# Patient Record
Sex: FEMALE | Race: BLACK OR AFRICAN AMERICAN | NOT HISPANIC OR LATINO | Employment: PART TIME | URBAN - METROPOLITAN AREA
[De-identification: names, ages, dates, MRNs, and addresses within clinical notes are randomized per-mention and may not be internally consistent; named-entity substitution may affect disease eponyms.]

---

## 2017-01-03 DIAGNOSIS — N83.201 BILATERAL OVARIAN CYSTS: Primary | ICD-10-CM

## 2017-01-03 DIAGNOSIS — N83.202 BILATERAL OVARIAN CYSTS: Primary | ICD-10-CM

## 2017-02-01 ENCOUNTER — HOSPITAL ENCOUNTER (EMERGENCY)
Facility: HOSPITAL | Age: 52
Discharge: HOME | End: 2017-02-01
Attending: EMERGENCY MEDICINE
Payer: MEDICAID

## 2017-02-01 VITALS
HEIGHT: 62 IN | DIASTOLIC BLOOD PRESSURE: 77 MMHG | TEMPERATURE: 99 F | SYSTOLIC BLOOD PRESSURE: 140 MMHG | OXYGEN SATURATION: 98 % | RESPIRATION RATE: 18 BRPM | BODY MASS INDEX: 32.94 KG/M2 | HEART RATE: 75 BPM | WEIGHT: 179 LBS

## 2017-02-01 DIAGNOSIS — L72.0 EPIDERMOID CYST: Primary | ICD-10-CM

## 2017-02-01 PROCEDURE — 25000003 PHARM REV CODE 250: Performed by: PHYSICIAN ASSISTANT

## 2017-02-01 PROCEDURE — 99283 EMERGENCY DEPT VISIT LOW MDM: CPT | Mod: 25

## 2017-02-01 PROCEDURE — 10060 I&D ABSCESS SIMPLE/SINGLE: CPT

## 2017-02-01 RX ORDER — KETOROLAC TROMETHAMINE 30 MG/ML
10 INJECTION, SOLUTION INTRAMUSCULAR; INTRAVENOUS
Status: DISCONTINUED | OUTPATIENT
Start: 2017-02-01 | End: 2017-02-01 | Stop reason: HOSPADM

## 2017-02-01 RX ORDER — DICYCLOMINE HYDROCHLORIDE 20 MG/1
20 TABLET ORAL 2 TIMES DAILY
Qty: 20 TABLET | Refills: 0 | Status: SHIPPED | OUTPATIENT
Start: 2017-02-01 | End: 2017-03-03

## 2017-02-01 RX ORDER — LIDOCAINE HYDROCHLORIDE 10 MG/ML
10 INJECTION INFILTRATION; PERINEURAL
Status: DISCONTINUED | OUTPATIENT
Start: 2017-02-01 | End: 2017-02-01

## 2017-02-01 RX ORDER — HYDROCODONE BITARTRATE AND ACETAMINOPHEN 5; 325 MG/1; MG/1
1 TABLET ORAL
Status: DISCONTINUED | OUTPATIENT
Start: 2017-02-01 | End: 2017-02-01 | Stop reason: HOSPADM

## 2017-02-01 RX ORDER — LIDOCAINE HYDROCHLORIDE AND EPINEPHRINE 20; 10 MG/ML; UG/ML
10 INJECTION, SOLUTION INFILTRATION; PERINEURAL
Status: COMPLETED | OUTPATIENT
Start: 2017-02-01 | End: 2017-02-01

## 2017-02-01 RX ADMIN — LIDOCAINE HYDROCHLORIDE,EPINEPHRINE BITARTRATE 10 ML: 20; .01 INJECTION, SOLUTION INFILTRATION; PERINEURAL at 05:02

## 2017-02-01 NOTE — ED AVS SNAPSHOT
[facility]  [place] [address]               Carlos Palencia   2017  4:52 PM   ED    Description:  Female : 1965   Department:  [facility]           Your Care was Coordinated By:     Provider Role From To    Jg Isbell III, MD Attending Provider [phone] --    Chris Bergman PA-C Physician Assistant [phone] --      Reason for Visit     Abdominal Pain     Abscess           Diagnoses this Visit        Comments    Epidermoid cyst    -  Primary       ED Disposition     ED Disposition Condition Comment    Discharge             To Do List           Follow-up Information     Follow up with [facility]. Go on 2017.    Why:  Go to scheduled appt on Monday    Contact information:    [address] [address]  [phone]          Follow up with [facility].    Specialty:  Emergency Medicine    Why:  As needed, if begin with signs of infection, if unable to tolerate pain    Contact information:    [place] [id number] [phone]       These Medications        Disp Refills Start End    dicyclomine (BENTYL) 20 mg tablet 20 tablet 0 2017 3/3/2017    Take 1 tablet (20 mg total) by mouth 2 (two) times daily. - Oral    Pharmacy: [facility] [id number] [place] [address] [address] [facility] Ph #: [phone]         [facility]     [facility] -  Assistance  Registered nurses in the [facility] provide clinical advisement, health education, appointment booking, and other advisory services.  Call for this free service at [phone].             Medications           Message regarding Medications     Verify the changes and/or additions to your medication regime listed below are the same as discussed with your clinician today.  If  any of these changes or additions are incorrect, please notify your healthcare provider.        START taking these NEW medications        Refills    dicyclomine (BENTYL) 20 mg tablet 0    Sig: Take 1 tablet (20 mg total) by mouth 2 (two) times daily.    Class: Print    Route: Oral      These medications were administered today        Dose Freq    lidocaine-EPINEPHrine 2%-1:100,000 injection 10 mL 10 mL ED 1 Time    Sig: Inject 10 mLs into the skin ED 1 Time.    Class: Normal    Route: Intradermal    Cosign for Ordering: Required by Jg Isbell III, MD    hydrocodone-acetaminophen 5-325mg per tablet 1 tablet 1 tablet ED 1 Time    Sig: Take 1 tablet by mouth ED 1 Time.    Class: Normal    Route: Oral    Cosign for Ordering: Required by Jg Isbell III, MD    ketorolac injection 10 mg 10 mg ED 1 Time    Sig: Inject 10 mg into the muscle ED 1 Time.    Class: Normal    Route: Intramuscular    Cosign for Ordering: Required by Jg Isbell III, MD      STOP taking these medications     diphenhydramine-acetaminophen (TYLENOL PM)  mg Tab Take 1 tablet by mouth once daily.    oxycodone-acetaminophen (PERCOCET)  mg per tablet Take 1 tablet by mouth every 4 (four) hours as needed for Pain.           Verify that the below list of medications is an accurate representation of the medications you are currently taking.  If none reported, the list may be blank. If incorrect, please contact your healthcare provider. Carry this list with you in case of emergency.           Current Medications     amlodipine (NORVASC) 10 MG tablet Take 10 mg by mouth once daily.    dicyclomine (BENTYL) 20 mg tablet Take 1 tablet (20 mg total) by mouth 2 (two) times daily.    hydrocodone-acetaminophen 5-325mg per tablet 1 tablet Take 1 tablet by mouth ED 1 Time.    ketorolac injection 10 mg Inject 10 mg into the muscle ED 1 Time.    pravastatin (PRAVACHOL) 10 MG tablet Take 10 mg by mouth once daily.           Clinical Reference  "Information           Your Vitals Were     BP Pulse Temp Resp Height Weight    140/77 (BP Location: Left arm, Patient Position: Sitting) 75 98.5 °F (36.9 °C) (Oral) 18 5' 2" (1.575 m) 81.2 kg (179 lb)    Last Period SpO2 BMI          01/01/2017 (Approximate) 98% 32.74 kg/m2        Allergies as of 2/1/2017     No Known Allergies      Immunizations Administered on Date of Encounter - 2/1/2017     None      ED Micro, Lab, POCT     None      ED Imaging Orders     None        Discharge Instructions         Understanding Epidermoid Cyst    An epidermoid cyst is a small abnormal growth in the top layers of the skin. It is filled with keratin, the same proteins that make up your hair and nails. These cysts grow slowly. They can grow anywhere on the body. But they are most often found on the face, behind the ears, and on the chest or upper back.  How to say it  [id number] sist   What causes an epidermoid cyst?  An epidermoid cyst may occur because of an injury to the skin or from acne.  Symptoms of an epidermoid cyst  An epidermoid cyst is a subcutaneous bump. This means it is just below the skin. It may be yellow or skin-colored. It often has a small black anibal in the middle of it, like a blackhead.  An epidermoid cyst rarely causes pain, unless it ruptures or becomes infected. It may ooze keratin, a white, cheesy, smelly material. If the cyst becomes inflamed or infected, it may be:  · Bad smelling  · Red  · Swollen  · Tender  Treatment for an epidermoid cyst  Many epidermoid cysts dont cause any problems. They dont necessarily need to be treated. They may go away on their own. But you may want to treat it if you dont like how it looks or it becomes inflamed.  Treatment options include:  · Steroid injection. A steroid may be injected into the cyst. This may help ease inflammation. It may also prevent infection.  · Surgical removal. The cyst can be cut out. It may also need to be drained first. There is a slight " chance the cyst may come back.  · Antibiotics. In some cases, you may need to take an oral antibiotic to prevent infection and regrowth.  When to call your healthcare provider  Call your healthcare provider right away if you have any of these:  · Fever of 100.4°F (38°C) or higher, or as directed  · Redness, swelling, or fluid leaking from your incision that gets worse  · Pain that gets worse  · Symptoms that dont get better, or get worse  · New symptoms   © 6735-4244 [employer]. [address] [place] [address]. All rights reserved. This information is not intended as a substitute for professional medical care. Always follow your healthcare professional's instructions.          [facility] Sign-Up     Activating your [facility] account is as easy as 1-2-3!     1) Visit [email or web address], select Sign Up Now, enter this activation code and your date of birth, then select Next.  [email or web address]  Expires: 3/18/2017  5:56 PM      2) Create a username and password to use when you visit [facility] in the future and select a security question in case you lose your password and select Next.    3) Enter your e-mail address and click Sign Up!    Additional Information  If you have questions, please e-mail [email or web address] or call [phone] to talk to our [facility] staff. Remember, [facility] is NOT to be used for urgent needs. For medical emergencies, dial 911.          [facility] complies with applicable Federal civil rights laws and does not discriminate on the basis of race, color, national origin, age, disability, or sex.        Language Assistance Services     ATTENTION: Language assistance services are available, free of charge. Please call [phone].      ATENCIÓN: Si habla micky, tiene a gonzalez disposición servicios gratuitos de asistencia lingüística. Llame al [phone].     CHÚ Ý: N?u b?n nói Ti?ng Vi?t, có các d?ch v? h? tr? ngôn ng? mi?n phí dành cho b?n. G?i s?  [phone].

## 2017-02-01 NOTE — ED PROVIDER NOTES
"Encounter Date: 2/1/2017       History     Chief Complaint   Patient presents with    Abdominal Pain     Pt states she has a possible abscess in her back that has gotten very sore and tender.  Also c/o abdominal pain    Abscess     Review of patient's allergies indicates:  No Known Allergies  HPI Comments: 52yo f with pmh HTN, HLD, Arthritis, left ovarian cyst, presents with chief complaint abscess to back x 1 week. Pt states she has had a "bump" on her back for a few years, but over the past week has become significantly swollen and painful. Denies fever/chills/recent illness. Pt also admits to intermittent abdominal pain recently, but has no complaints of abdominal pain at this time. She had established care at [facility], but recently received medicaid coverage and needed to establish another primary care physician. She has an appt with [facility] on Monday.     The history is provided by the patient.     Past Medical History   Diagnosis Date    Arthritis     History of heavy vaginal bleeding     Hyperlipidemia     Hypertension      No past medical history pertinent negatives.  Past Surgical History   Procedure Laterality Date    Dilation and curettage of uterus       Family History   Problem Relation Age of Onset    Hypertension Mother     Diabetes Mother     Cancer Father     Cancer Brother      Social History   Substance Use Topics    Smoking status: Never Smoker    Smokeless tobacco: None    Alcohol use Yes      Comment: socially     Review of Systems   Constitutional: Negative for activity change, appetite change, chills and fever.   HENT: Negative.    Eyes: Negative.    Respiratory: Negative for cough, shortness of breath and wheezing.    Cardiovascular: Negative for chest pain and leg swelling.   Gastrointestinal: Positive for abdominal pain. Negative for abdominal distention, constipation, diarrhea, nausea and vomiting.   Endocrine: Negative.    Genitourinary: Negative for difficulty urinating, " dysuria, flank pain, hematuria and menstrual problem.   Musculoskeletal: Negative for neck pain and neck stiffness.   Skin: Positive for wound. Negative for color change and rash.   Allergic/Immunologic: Negative.    Neurological: Negative.    Hematological: Negative.    Psychiatric/Behavioral: Negative.    All other systems reviewed and are negative.      Physical Exam   Initial Vitals   BP Pulse Resp Temp SpO2   02/01/17 1620 02/01/17 1620 02/01/17 1620 02/01/17 1620 02/01/17 1620   140/77 75 18 98.5 °F (36.9 °C) 98 %     Physical Exam    Nursing note and vitals reviewed.  Constitutional: She appears well-developed and well-nourished. She does not have a sickly appearance. She does not appear ill. No distress.   HENT:   Head: Normocephalic and atraumatic.   Right Ear: External ear normal.   Left Ear: External ear normal.   Nose: Nose normal.   Mouth/Throat: Oropharynx is clear and moist.   Eyes: Conjunctivae and EOM are normal.   Neck: Normal range of motion. Neck supple.   Cardiovascular: Normal rate, regular rhythm, normal heart sounds and intact distal pulses.   Pulmonary/Chest: Breath sounds normal. No respiratory distress. She has no wheezes. She has no rhonchi. She exhibits no tenderness.   Abdominal: Soft. Bowel sounds are normal. She exhibits no distension and no mass. There is no tenderness. There is no guarding.   Musculoskeletal: Normal range of motion. She exhibits no tenderness.   Lymphadenopathy:     She has no cervical adenopathy.   Neurological: She is alert and oriented to person, place, and time. She has normal strength.   Skin: Skin is warm and dry. No rash and no abscess noted. No erythema.   Epidermoid cyst with follicular opening in the mid back just left of spine, approx 1.5x1.5cm. No cellulitis or erythema; scant fluctuance palpated but able to express some black material through massage   Psychiatric: She has a normal mood and affect. Her behavior is normal. Judgment and thought content  normal.         ED Course   I & D - Incision and Drainage  Date/Time: 2017 5:42 PM  Performed by: HERMELINDO GUNDERSON  Authorized by: MAKI SHEFFIELD III   Type: cyst (epidermoid cyst)  Body area: trunk  Location details: back  Anesthesia: local infiltration    Anesthesia:  Anesthesia: local infiltration  Local Anesthetic: lidocaine 2% with epinephrine   Anesthetic total: 2 mL  Patient sedated: no  Scalpel size: 11  Incision type: single straight  Complexity: simple  Drainage: bloody (necrotic tissue)  Drainage amount: scant  Wound treatment: incision and  wound left open  Complications: No  Patient tolerance: Patient tolerated the procedure well with no immediate complications        Labs Reviewed - No data to display          Medical Decision Making:   Initial Assessment:   50yo f with chief complaint epidermoid cyst to back  Differential Diagnosis:   Cyst, abscess, furuncle, carbuncle  ED Management:  Well-appearing, in NAD. Pt has no abdominal pain at this time. Denies n/v/d. Afebrile. Decision made to express material through I&D due to acute swelling and pain. Cyst opened with small incision, some necrotic tissue expressed. Pt to establish primary care on Monday, so will have wound reevaluated at that time. Pt to return to ED if signs of infection, if unable to tolerate pain.   Other:   I have discussed this case with another health care provider.       <> Summary of the Discussion: I did discuss this case with .              Attending Attestation:     Physician Attestation Statement for NP/PA:   I discussed this assessment and plan of this patient with the NP/PA, but I did not personally examine the patient. The face to face encounter was performed by the NP/PA.    Other NP/PA Attestation Additions:      Medical Decision Makin-year-old female presents complaining of epidermoid cyst to her back.  Physical examination does reveal a tender cyst, although without convincing evidence of infection.   Given the acute increase in size and development of pain, we have performed an incision and drainage with expression of caseous material and also necrotic tissue.  Will discharge with recommendations for supportive care.                  ED Course     Clinical Impression:   The encounter diagnosis was Epidermoid cyst.          Chris Bergman PA-C  02/02/17 0106       Jg Isbell III, MD  02/21/17 4628

## 2017-02-23 ENCOUNTER — HOSPITAL ENCOUNTER (OUTPATIENT)
Dept: RADIOLOGY | Facility: HOSPITAL | Age: 52
Discharge: HOME | End: 2017-02-23
Attending: NURSE PRACTITIONER
Payer: MEDICAID

## 2017-02-23 DIAGNOSIS — N92.6 IRREGULAR MENSTRUAL CYCLE: ICD-10-CM

## 2017-02-23 DIAGNOSIS — M19.90 OSTEOARTHRITIS: ICD-10-CM

## 2017-02-23 PROCEDURE — 73560 X-RAY EXAM OF KNEE 1 OR 2: CPT | Mod: TC,LT

## 2017-02-23 PROCEDURE — 73560 X-RAY EXAM OF KNEE 1 OR 2: CPT | Mod: 26,LT,, | Performed by: RADIOLOGY

## 2017-02-23 PROCEDURE — 76856 US EXAM PELVIC COMPLETE: CPT | Mod: 26,,, | Performed by: RADIOLOGY

## 2017-02-23 PROCEDURE — 76830 TRANSVAGINAL US NON-OB: CPT | Mod: 26,,, | Performed by: RADIOLOGY

## 2017-02-23 PROCEDURE — 76856 US EXAM PELVIC COMPLETE: CPT | Mod: TC

## 2017-04-26 ENCOUNTER — HOSPITAL ENCOUNTER (OUTPATIENT)
Dept: RADIOLOGY | Facility: HOSPITAL | Age: 52
Discharge: HOME | End: 2017-04-26
Attending: OBSTETRICS & GYNECOLOGY
Payer: MEDICAID

## 2017-04-26 DIAGNOSIS — Z12.31 VISIT FOR SCREENING MAMMOGRAM: ICD-10-CM

## 2017-04-26 PROCEDURE — 77063 BREAST TOMOSYNTHESIS BI: CPT | Mod: 26,,, | Performed by: RADIOLOGY

## 2017-04-26 PROCEDURE — 77067 SCR MAMMO BI INCL CAD: CPT | Mod: TC

## 2017-04-26 PROCEDURE — 77067 SCR MAMMO BI INCL CAD: CPT | Mod: 26,,, | Performed by: RADIOLOGY

## 2017-04-27 ENCOUNTER — TELEPHONE (OUTPATIENT)
Dept: OBSTETRICS AND GYNECOLOGY | Facility: HOSPITAL | Age: 52
End: 2017-04-27

## 2017-04-27 DIAGNOSIS — R92.8 ABNORMAL MAMMOGRAM OF BOTH BREASTS: Primary | ICD-10-CM

## 2017-05-01 ENCOUNTER — TELEPHONE (OUTPATIENT)
Dept: OBSTETRICS AND GYNECOLOGY | Facility: CLINIC | Age: 52
End: 2017-05-01

## 2017-05-01 NOTE — TELEPHONE ENCOUNTER
5/1/2017 Patient is aware of abnormal mammo and will have additional views done per KAYCEE Lanier. derek

## 2017-05-01 NOTE — TELEPHONE ENCOUNTER
----- Message from Doris Covarrubias sent at 5/1/2017  8:20 AM CDT -----  Contact: self  Patient requesting a call back from Trista per message 4/27. Patient states that she is concerned and has taking off from work today and would like a call back immediately. Please contact her at [phone].    Thanks!

## 2017-05-03 ENCOUNTER — TELEPHONE (OUTPATIENT)
Dept: RADIOLOGY | Facility: HOSPITAL | Age: 52
End: 2017-05-03

## 2017-05-03 NOTE — TELEPHONE ENCOUNTER
informed patient we are waiting on outside imaging from [facility] for comparison. Additoinal imaging may not be necessary. Appt cancelled and pt will be informed once we receive outside imaging

## 2017-05-03 NOTE — TELEPHONE ENCOUNTER
Waiting on prior mammograms from [facility]. Faxed release on 4-26-17.        By Belkis Boucher, RT

## 2017-05-11 ENCOUNTER — TELEPHONE (OUTPATIENT)
Dept: RADIOLOGY | Facility: HOSPITAL | Age: 52
End: 2017-05-11

## 2017-05-11 ENCOUNTER — TELEPHONE (OUTPATIENT)
Dept: OBSTETRICS AND GYNECOLOGY | Facility: HOSPITAL | Age: 52
End: 2017-05-11

## 2017-05-16 ENCOUNTER — HOSPITAL ENCOUNTER (OUTPATIENT)
Dept: RADIOLOGY | Facility: HOSPITAL | Age: 52
Discharge: HOME | End: 2017-05-16
Attending: OBSTETRICS & GYNECOLOGY
Payer: MEDICAID

## 2017-05-16 DIAGNOSIS — R92.8 ABNORMAL MAMMOGRAM OF BOTH BREASTS: ICD-10-CM

## 2017-05-16 PROCEDURE — 76642 ULTRASOUND BREAST LIMITED: CPT | Mod: 26,RT,, | Performed by: RADIOLOGY

## 2017-05-16 PROCEDURE — 76642 ULTRASOUND BREAST LIMITED: CPT | Mod: TC,RT

## 2017-05-16 PROCEDURE — 77061 BREAST TOMOSYNTHESIS UNI: CPT | Mod: 26,,, | Performed by: RADIOLOGY

## 2017-05-16 PROCEDURE — 77065 DX MAMMO INCL CAD UNI: CPT | Mod: 26,,, | Performed by: RADIOLOGY

## 2017-05-16 PROCEDURE — 77061 BREAST TOMOSYNTHESIS UNI: CPT | Mod: TC

## 2017-05-17 ENCOUNTER — TELEPHONE (OUTPATIENT)
Dept: OBSTETRICS AND GYNECOLOGY | Facility: CLINIC | Age: 52
End: 2017-05-17

## 2017-05-17 NOTE — TELEPHONE ENCOUNTER
----- Message from Edwin Lanier MD sent at 5/17/2017  8:44 AM CDT -----  Call pt and tell her MMG and ultrasound were both normal.

## 2017-11-30 ENCOUNTER — HOSPITAL ENCOUNTER (EMERGENCY)
Facility: HOSPITAL | Age: 52
Discharge: HOME | End: 2017-11-30
Attending: EMERGENCY MEDICINE
Payer: MEDICAID

## 2017-11-30 VITALS
WEIGHT: 180 LBS | OXYGEN SATURATION: 98 % | HEIGHT: 62 IN | BODY MASS INDEX: 33.13 KG/M2 | TEMPERATURE: 98 F | HEART RATE: 55 BPM | DIASTOLIC BLOOD PRESSURE: 70 MMHG | SYSTOLIC BLOOD PRESSURE: 126 MMHG | RESPIRATION RATE: 18 BRPM

## 2017-11-30 DIAGNOSIS — R07.89 CHEST PAIN, NON-CARDIAC: Primary | ICD-10-CM

## 2017-11-30 PROCEDURE — 93005 ELECTROCARDIOGRAM TRACING: CPT

## 2017-11-30 PROCEDURE — 99284 EMERGENCY DEPT VISIT MOD MDM: CPT

## 2017-11-30 PROCEDURE — 93010 ELECTROCARDIOGRAM REPORT: CPT | Mod: ,,, | Performed by: INTERNAL MEDICINE

## 2017-11-30 RX ORDER — IBUPROFEN 600 MG/1
600 TABLET ORAL EVERY 6 HOURS PRN
Qty: 20 TABLET | Refills: 0 | OUTPATIENT
Start: 2017-11-30 | End: 2019-02-11

## 2017-11-30 RX ORDER — METHOCARBAMOL 500 MG/1
1000 TABLET, FILM COATED ORAL 3 TIMES DAILY
Qty: 30 TABLET | Refills: 0 | Status: SHIPPED | OUTPATIENT
Start: 2017-11-30 | End: 2017-12-05

## 2017-11-30 NOTE — DISCHARGE INSTRUCTIONS
Please return immediately if you get worse or if new problems develop.  Please make an appointment to see your primary care doctor this week.  Rest.  Use a heating pad.  Rest.

## 2017-11-30 NOTE — ED TRIAGE NOTES
53 yo female comes in with the cc of left side pain. Pt states this has been going on since Tuesday after a work out. Pt can sleep in only one position, due to pain if she moves.

## 2017-11-30 NOTE — ED PROVIDER NOTES
Encounter Date: 11/30/2017    SCRIBE #1 NOTE: I, Easton Pickering, am scribing for, and in the presence of,  Jg Saleh MD. I have scribed the following portions of the note - Other sections scribed: HPI and ROS.       History     Chief Complaint   Patient presents with    Chest Pain     reproducable chest pains with movement x 2 days.  center chest radiating to neck, arm, leg, and back.  denies n/v/d or fever.     CC: Chest Pain     HPI:This 52 y.o F with arthritis, HLD and HTN presents to the ED c/o acute onset of L side chest pain radiating to L shoulder which began Tuesday PM after exercising. Pt's pain is worse when attempting to lay on her side and with deep inhalation. Additionally, the pt reports chronic L lateral knee pain. The pt denies fever, chills, headache, otalgia, dysuria, SOB and hx of DVT and CAD. No prior tx.       The history is provided by the patient. No  was used.     Review of patient's allergies indicates:  No Known Allergies  Past Medical History:   Diagnosis Date    Arthritis     History of heavy vaginal bleeding     Hyperlipidemia     Hypertension      Past Surgical History:   Procedure Laterality Date    DILATION AND CURETTAGE OF UTERUS       Family History   Problem Relation Age of Onset    Hypertension Mother     Diabetes Mother     Cancer Father     Cancer Brother      Social History   Substance Use Topics    Smoking status: Never Smoker    Smokeless tobacco: Not on file    Alcohol use Yes      Comment: socially     Review of Systems   Constitutional: Negative for chills and fever.   HENT: Negative for congestion, ear pain, rhinorrhea and sore throat.    Eyes: Negative for pain and visual disturbance.   Respiratory: Negative for cough and shortness of breath.    Cardiovascular: Positive for chest pain (L side).   Gastrointestinal: Negative for abdominal pain, diarrhea, nausea and vomiting.   Genitourinary: Negative for dysuria.   Musculoskeletal:  Negative for back pain and neck pain.        (+) L shoulder pain   Skin: Negative for rash.   Neurological: Negative for headaches.       Physical Exam     Initial Vitals [11/30/17 0928]   BP Pulse Resp Temp SpO2   (!) 140/84 60 15 98.5 °F (36.9 °C) 100 %      MAP       102.67         Physical Exam  The patient was examined specifically for the following:   General:No significant distress, Good color, Warm and dry. Head and neck:Scalp atraumatic, Neck supple. Neurological:Appropriate conversation, Gross motor deficits. Eyes:Conjugate gaze, Clear corneas. ENT: No epistaxis. Cardiac: Regular rate and rhythm, Grossly normal heart tones. Pulmonary: Wheezing, Rales. Gastrointestinal: Abdominal tenderness, Abdominal distention. Musculoskeletal: Extremity deformity, Apparent pain with range of motion of the joints. Skin: Rash.   The findings on examination were normal except for the following: The lungs are clear and free wheezing rales of the rhonchi.  Heart tones are normal.  The patient is a regular rate and rhythm.  The heart rate is 70.  The patient has pain with deep breathing during the physical exam.  There is no evidence of respiratory distress or shortness of breath.  The patient's respiratory rate is 16.  ED Course   Procedures  Labs Reviewed - No data to display  EKG Readings: (Independently Interpreted)   The patient's EKG reveals a sinus bradycardia with a heart rate of 59.  The WA QRS and QT intervals are normal.  There is no evidence of acute myocardial infarction or malignant arrhythmia.       X-Rays:   Independently Interpreted Readings:   Other Readings:  Chest x-ray fails to reveal pneumothorax pneumonia pleural effusion.    Medical decision making: Given the above this patient presents to the emergency with left-sided chest pain is worse when she lays on either side.  Chest x-ray fails to reveal pneumothorax pneumonia pleural effusion.  The patient is not short of breath or tachycardic.  I doubt  pulmonary embolus.  This pain is sharp positional and unilateral.  It does not seem consistent with coronary artery disease.  EKG is essentially unremarkable.  I will treat this patient with ibuprofen and referred her to follow-up with primary care.             Scribe Attestation:   Scribe #1: I performed the above scribed service and the documentation accurately describes the services I performed. I attest to the accuracy of the note.    Attending Attestation:           Physician Attestation for Scribe:  Physician Attestation Statement for Scribe #1: I, Jg Saleh MD, reviewed documentation, as scribed by Easton Pickering in my presence, and it is both accurate and complete.                 ED Course      Clinical Impression:   The encounter diagnosis was Chest pain, non-cardiac.                           Jg Saleh MD  12/08/17 0704

## 2017-12-04 ENCOUNTER — HOSPITAL ENCOUNTER (EMERGENCY)
Facility: HOSPITAL | Age: 52
Discharge: HOME | End: 2017-12-04
Attending: EMERGENCY MEDICINE
Payer: MEDICAID

## 2017-12-04 VITALS
RESPIRATION RATE: 19 BRPM | DIASTOLIC BLOOD PRESSURE: 84 MMHG | BODY MASS INDEX: 31.65 KG/M2 | SYSTOLIC BLOOD PRESSURE: 137 MMHG | HEART RATE: 62 BPM | TEMPERATURE: 98 F | HEIGHT: 62 IN | OXYGEN SATURATION: 99 % | WEIGHT: 172 LBS

## 2017-12-04 DIAGNOSIS — R51.9 FRONTAL HEADACHE: Primary | ICD-10-CM

## 2017-12-04 LAB
B-HCG UR QL: NEGATIVE
CTP QC/QA: YES

## 2017-12-04 PROCEDURE — 25000003 PHARM REV CODE 250: Performed by: PHYSICIAN ASSISTANT

## 2017-12-04 PROCEDURE — 99283 EMERGENCY DEPT VISIT LOW MDM: CPT | Mod: 25

## 2017-12-04 PROCEDURE — 81025 URINE PREGNANCY TEST: CPT | Performed by: PHYSICIAN ASSISTANT

## 2017-12-04 RX ORDER — BUTALBITAL, ACETAMINOPHEN AND CAFFEINE 50; 325; 40 MG/1; MG/1; MG/1
1 TABLET ORAL EVERY 6 HOURS PRN
Qty: 12 TABLET | Refills: 0 | Status: SHIPPED | OUTPATIENT
Start: 2017-12-04 | End: 2018-01-03

## 2017-12-04 RX ORDER — BUTALBITAL, ACETAMINOPHEN AND CAFFEINE 50; 325; 40 MG/1; MG/1; MG/1
2 TABLET ORAL ONCE
Status: COMPLETED | OUTPATIENT
Start: 2017-12-04 | End: 2017-12-04

## 2017-12-04 RX ADMIN — BUTALBITAL, ACETAMINOPHEN AND CAFFEINE 2 TABLET: 50; 325; 40 TABLET ORAL at 08:12

## 2017-12-04 NOTE — ED PROVIDER NOTES
Encounter Date: 12/4/2017    SCRIBE #1 NOTE: I, Easton Pickering, am scribing for, and in the presence of,  Drew Leiva PA-C. I have scribed the following portions of the note - Other sections scribed: HPI and ROS.       History     Chief Complaint   Patient presents with    Headache     /89 at home last night, intermittent some relief with ibuprofen     CC: Headache     HPI: This 52 y.o F with arthritis, HTN and HLD presents to the ED c/o acute onset of a severe (9/10) frontal headache which began yesterday. The pt attempted tx with NSAID's which relieved the headache temporarily. Pt's headache recurred this morning. The pt denies previous episodes of similar symptoms, but does not she was seen by a neurologist as a child for headaches. The pt denies dizziness, visual disturbances, numbness, weakness, dental pain, otalgia, fever, chills, nausea and emesis.      The history is provided by the patient. No  was used.     Review of patient's allergies indicates:  No Known Allergies  Past Medical History:   Diagnosis Date    Arthritis     History of heavy vaginal bleeding     Hyperlipidemia     Hypertension      Past Surgical History:   Procedure Laterality Date    DILATION AND CURETTAGE OF UTERUS       Family History   Problem Relation Age of Onset    Hypertension Mother     Diabetes Mother     Cancer Father     Cancer Brother      Social History   Substance Use Topics    Smoking status: Never Smoker    Smokeless tobacco: Not on file    Alcohol use Yes      Comment: socially     Review of Systems   Constitutional: Negative for chills, diaphoresis and fever.   HENT: Negative for dental problem, rhinorrhea and sore throat.    Eyes: Negative for redness and visual disturbance.   Respiratory: Negative for cough and shortness of breath.    Cardiovascular: Negative for chest pain.   Gastrointestinal: Negative for abdominal pain, diarrhea, nausea and vomiting.   Genitourinary: Negative  for dysuria, frequency and urgency.   Musculoskeletal: Negative for back pain and neck pain.   Skin: Negative for rash.   Neurological: Positive for headaches. Negative for dizziness, weakness and numbness.   Psychiatric/Behavioral: The patient is not nervous/anxious.        Physical Exam     Initial Vitals [12/04/17 0806]   BP Pulse Resp Temp SpO2   (!) 177/87 66 19 98.3 °F (36.8 °C) 99 %      MAP       117         Physical Exam    Nursing note and vitals reviewed.  Constitutional: She appears well-developed and well-nourished. She is not diaphoretic. No distress.   HENT:   Head: Normocephalic and atraumatic.   Right Ear: Tympanic membrane, external ear and ear canal normal. No mastoid tenderness.   Left Ear: Tympanic membrane, external ear and ear canal normal. No mastoid tenderness.   Nose: Nose normal. No rhinorrhea. Right sinus exhibits no maxillary sinus tenderness and no frontal sinus tenderness. Left sinus exhibits no maxillary sinus tenderness and no frontal sinus tenderness.   Mouth/Throat: Uvula is midline and oropharynx is clear and moist. Dental caries present. No oropharyngeal exudate, posterior oropharyngeal edema, posterior oropharyngeal erythema or tonsillar abscesses.   Eyes: Conjunctivae and EOM are normal. Right eye exhibits no discharge. Left eye exhibits no discharge.   Neck: Normal range of motion. No tracheal deviation present. No JVD present.   Cardiovascular: Normal rate, regular rhythm and normal heart sounds. Exam reveals no friction rub.    No murmur heard.  Pulmonary/Chest: Breath sounds normal. No stridor. No respiratory distress. She has no decreased breath sounds. She has no wheezes. She has no rhonchi. She has no rales. She exhibits no tenderness.   Musculoskeletal: Normal range of motion.   Neurological: She is alert and oriented to person, place, and time. She displays no seizure activity. Coordination and gait normal. GCS eye subscore is 4. GCS verbal subscore is 5. GCS motor  subscore is 6.   Skin: Skin is warm and dry. No rash and no abscess noted. No erythema. No pallor.         ED Course   Procedures  Labs Reviewed   POCT URINE PREGNANCY             Medical Decision Making:   History:   Old Medical Records: I decided to obtain old medical records.      This is an urgent evaluation of a 52 y.o. female with a PMHx of HTN presenting to the ED for a headache. Denies traumatic injury, LOC, emesis, and seizure. Denies use of blood thinners. Also denies fever, visual disturbance, neck pain, neck rigidity, back pain, sinus tenderness, dental pain, and otalgia. /87, vitals otherwise WNL, afebrile.      I am most suspicious for tension HA. I have also considered but doubt acute intracranial hemorrhage and meningitis as the patient is non-toxic appearing and in no acute distress, neurologically intact with no focal deficits, the neck is supple with no evidence of meningeal signs, and there is no evidence of visible trauma. I have also considered but doubt sinusitis, dental abscess, AOM, and mastoiditis. I doubt acute angle closure glaucoma. Furthermore, I have considered the possibility of an intracranial mass, but is neurologically intact on exam.    Patient given Fioricet in ED with improvement of HA. Remains well appearing. Discharged home with Fioricet.    I discussed with the patient the diagnosis, treatment plan, indications for return to the emergency department, and for expected follow-up. The patient verbalized an understanding. The patient is asked if there are any questions or concerns. We discuss the case, until all issues are addressed to the patients satisfaction. Patient understands and is agreeable to the plan.     I discussed this patient with Dr. Isbell who is in agreement with my assessment and plan.           Scribe Attestation:   Scribe #1: I performed the above scribed service and the documentation accurately describes the services I performed. I attest to the  accuracy of the note.    Attending Attestation:           Physician Attestation for Scribe:  Physician Attestation Statement for Scribe #1: I, Drew Leiva PA-C, reviewed documentation, as scribed by Easton Pickering in my presence, and it is both accurate and complete.                 ED Course      Clinical Impression:   The encounter diagnosis was Frontal headache.    Disposition:   Disposition: Discharged  Condition: Stable                        Drew Leiva PA-C  12/04/17 [id number]

## 2018-12-13 ENCOUNTER — HOSPITAL ENCOUNTER (EMERGENCY)
Facility: HOSPITAL | Age: 53
Discharge: HOME | End: 2018-12-13
Attending: EMERGENCY MEDICINE
Payer: MEDICAID

## 2018-12-13 VITALS
BODY MASS INDEX: 33.68 KG/M2 | SYSTOLIC BLOOD PRESSURE: 132 MMHG | HEART RATE: 67 BPM | DIASTOLIC BLOOD PRESSURE: 76 MMHG | WEIGHT: 183 LBS | TEMPERATURE: 98 F | RESPIRATION RATE: 18 BRPM | OXYGEN SATURATION: 100 % | HEIGHT: 62 IN

## 2018-12-13 DIAGNOSIS — R07.9 CHEST PAIN OF UNCERTAIN ETIOLOGY: Primary | ICD-10-CM

## 2018-12-13 LAB
ALBUMIN SERPL BCP-MCNC: 3.9 G/DL
ALP SERPL-CCNC: 66 U/L
ALT SERPL W/O P-5'-P-CCNC: 12 U/L
ANION GAP SERPL CALC-SCNC: 10 MMOL/L
AST SERPL-CCNC: 19 U/L
B-HCG UR QL: NEGATIVE
BASOPHILS # BLD AUTO: 0.01 K/UL
BASOPHILS NFR BLD: 0.2 %
BILIRUB SERPL-MCNC: 0.4 MG/DL
BNP SERPL-MCNC: <10 PG/ML
BUN SERPL-MCNC: 14 MG/DL
CALCIUM SERPL-MCNC: 9.7 MG/DL
CHLORIDE SERPL-SCNC: 105 MMOL/L
CO2 SERPL-SCNC: 26 MMOL/L
CREAT SERPL-MCNC: 0.7 MG/DL
CTP QC/QA: YES
D DIMER PPP IA.FEU-MCNC: 0.19 MG/L FEU
DIFFERENTIAL METHOD: ABNORMAL
EOSINOPHIL # BLD AUTO: 0.2 K/UL
EOSINOPHIL NFR BLD: 5 %
ERYTHROCYTE [DISTWIDTH] IN BLOOD BY AUTOMATED COUNT: 17.7 %
EST. GFR  (AFRICAN AMERICAN): >60 ML/MIN/1.73 M^2
EST. GFR  (NON AFRICAN AMERICAN): >60 ML/MIN/1.73 M^2
GLUCOSE SERPL-MCNC: 85 MG/DL
HCT VFR BLD AUTO: 36.1 %
HGB BLD-MCNC: 11.1 G/DL
LIPASE SERPL-CCNC: 31 U/L
LYMPHOCYTES # BLD AUTO: 2.4 K/UL
LYMPHOCYTES NFR BLD: 50 %
MCH RBC QN AUTO: 25 PG
MCHC RBC AUTO-ENTMCNC: 30.7 G/DL
MCV RBC AUTO: 81 FL
MONOCYTES # BLD AUTO: 0.4 K/UL
MONOCYTES NFR BLD: 7.7 %
NEUTROPHILS # BLD AUTO: 1.8 K/UL
NEUTROPHILS NFR BLD: 37.1 %
PLATELET # BLD AUTO: 293 K/UL
PMV BLD AUTO: 10.3 FL
POTASSIUM SERPL-SCNC: 3.5 MMOL/L
PROT SERPL-MCNC: 7.8 G/DL
RBC # BLD AUTO: 4.44 M/UL
SODIUM SERPL-SCNC: 141 MMOL/L
TROPONIN I SERPL DL<=0.01 NG/ML-MCNC: <0.006 NG/ML
WBC # BLD AUTO: 4.82 K/UL

## 2018-12-13 PROCEDURE — 80053 COMPREHEN METABOLIC PANEL: CPT

## 2018-12-13 PROCEDURE — 81025 URINE PREGNANCY TEST: CPT | Performed by: NURSE PRACTITIONER

## 2018-12-13 PROCEDURE — 85379 FIBRIN DEGRADATION QUANT: CPT

## 2018-12-13 PROCEDURE — 93005 ELECTROCARDIOGRAM TRACING: CPT

## 2018-12-13 PROCEDURE — 93010 ELECTROCARDIOGRAM REPORT: CPT | Mod: ,,, | Performed by: INTERNAL MEDICINE

## 2018-12-13 PROCEDURE — 99285 EMERGENCY DEPT VISIT HI MDM: CPT | Mod: 25

## 2018-12-13 PROCEDURE — 25000003 PHARM REV CODE 250: Performed by: EMERGENCY MEDICINE

## 2018-12-13 PROCEDURE — 83690 ASSAY OF LIPASE: CPT

## 2018-12-13 PROCEDURE — 83880 ASSAY OF NATRIURETIC PEPTIDE: CPT

## 2018-12-13 PROCEDURE — 85025 COMPLETE CBC W/AUTO DIFF WBC: CPT

## 2018-12-13 PROCEDURE — S0028 INJECTION, FAMOTIDINE, 20 MG: HCPCS | Performed by: EMERGENCY MEDICINE

## 2018-12-13 PROCEDURE — 96374 THER/PROPH/DIAG INJ IV PUSH: CPT

## 2018-12-13 PROCEDURE — 84484 ASSAY OF TROPONIN QUANT: CPT

## 2018-12-13 RX ORDER — ASPIRIN 325 MG
325 TABLET ORAL
Status: COMPLETED | OUTPATIENT
Start: 2018-12-13 | End: 2018-12-13

## 2018-12-13 RX ORDER — FAMOTIDINE 10 MG/ML
20 INJECTION INTRAVENOUS
Status: COMPLETED | OUTPATIENT
Start: 2018-12-13 | End: 2018-12-13

## 2018-12-13 RX ADMIN — ASPIRIN 325 MG ORAL TABLET 325 MG: 325 PILL ORAL at 06:12

## 2018-12-13 RX ADMIN — LIDOCAINE HYDROCHLORIDE: 20 SOLUTION ORAL; TOPICAL at 06:12

## 2018-12-13 RX ADMIN — FAMOTIDINE 20 MG: 10 INJECTION INTRAVENOUS at 06:12

## 2018-12-14 NOTE — ED TRIAGE NOTES
"Pt arrived to the ED via personal transport with c/o midsternal chest pain and upper right back pain. Pt states that this approx 0500 she started having chest pain. Reports taking 81mg ASA and 2 tylenol when the pain began". Pt says she still having pain so came into the ED. Pt denies SOB, dizziness, n/v/d, weakness, sweating or fever but reports chest pain hurts more when breathing in and coughing.  "

## 2018-12-14 NOTE — DISCHARGE INSTRUCTIONS
Testing the emergency department does not suggest a heart attack or blood clot as the cause of your symptoms. Your symptoms may be due to acid reflux or musculoskeletal pain.  Avoid caffeine, alcohol and spicy foods. Start small amounts of a bland diet and advance as tolerated. Return to ER for fever, chest pain not improved by medication, breathing difficulty, bloody vomit, or vomit that looks like coffee grounds, or black or bloody stools.

## 2018-12-14 NOTE — ED PROVIDER NOTES
"Encounter Date: 12/13/2018    SCRIBE #1 NOTE: I, SandraPumaLidya Villaseñor, am scribing for, and in the presence of,  Lissette Bartholomew MD. I have scribed the following portions of the note - Other sections scribed: HPI, ROS, PE.       History     Chief Complaint   Patient presents with    Chest Pain     Midsternal chest pain/heaviness radiating to back that began at 5:00 am.  Pain is worse with movement and with deep breaths.  Denies SOB.     CC: Chest Pain    52 y/o female with HLD and HTN presents to the ED for emergent evaluation of acute onset mid sternal chest pain that radiates to thoracic back that started at 4:30AM. The patient reports a "heavy" sensation that is severe (7/10). She only reports the pain with deep inhalation, movement, and palpation. At rest, she denies pain. The patient attempted x1 aspirin and x2 Tylenol at 5AM today with mild relief. The patient works as a cook in a cafeteria. She states her sister was just dx with a blood clot. The patient denies smoking cigarettes. The patient denies personal or FMHx of MI. The patient denies SOB, N/V/D, diaphoresis, abdominal pain, or dysuria. No other symptoms reported.      The history is provided by the patient. No  was used.     Review of patient's allergies indicates:  No Known Allergies  Past Medical History:   Diagnosis Date    Arthritis     History of heavy vaginal bleeding     Hyperlipidemia     Hypertension      Past Surgical History:   Procedure Laterality Date    DILATION AND CURETTAGE OF UTERUS       Family History   Problem Relation Age of Onset    Hypertension Mother     Diabetes Mother     Cancer Father     Cancer Brother      Social History     Tobacco Use    Smoking status: Never Smoker    Smokeless tobacco: Never Used   Substance Use Topics    Alcohol use: Yes     Comment: socially    Drug use: No     Review of Systems   Constitutional: Negative for chills, diaphoresis and fever.   HENT: Negative for congestion, " ear pain, rhinorrhea and sore throat.    Eyes: Negative for redness.   Respiratory: Negative for cough and shortness of breath.    Cardiovascular: Positive for chest pain (mid sternal that radiates to thoracic back).   Gastrointestinal: Negative for abdominal pain, diarrhea, nausea and vomiting.   Genitourinary: Negative for difficulty urinating and dysuria.   Musculoskeletal: Positive for back pain (thoracic). Negative for neck pain.   Skin: Negative for rash.   Neurological: Negative for headaches.   All other systems reviewed and are negative.      Physical Exam     Initial Vitals [12/13/18 1807]   BP Pulse Resp Temp SpO2   (!) 146/85 71 18 98.3 °F (36.8 °C) 99 %      MAP       --         Physical Exam    Nursing note and vitals reviewed.  Constitutional: She is not diaphoretic. No distress.   HENT:   Head: Normocephalic and atraumatic.   Mouth/Throat: Oropharynx is clear and moist.   Eyes: EOM are normal. Pupils are equal, round, and reactive to light. No scleral icterus.   Neck: Normal range of motion. Neck supple. No JVD present.   Cardiovascular: Normal rate and regular rhythm.   Pulmonary/Chest: Breath sounds normal. No stridor. No respiratory distress.   Abdominal: Soft. Bowel sounds are normal. She exhibits no distension. There is no tenderness.   Musculoskeletal: Normal range of motion. She exhibits no edema or tenderness.   Paraspinal mid thoracic tenderness.   Neurological: She is alert and oriented to person, place, and time. She has normal strength. No cranial nerve deficit or sensory deficit.   Skin: Skin is warm and dry.   Psychiatric: She has a normal mood and affect.         ED Course   Procedures  Labs Reviewed   CBC W/ AUTO DIFFERENTIAL - Abnormal; Notable for the following components:       Result Value    Hemoglobin 11.1 (*)     Hematocrit 36.1 (*)     MCV 81 (*)     MCH 25.0 (*)     MCHC 30.7 (*)     RDW 17.7 (*)     Gran% 37.1 (*)     Lymph% 50.0 (*)     All other components within normal  limits   COMPREHENSIVE METABOLIC PANEL   TROPONIN I   B-TYPE NATRIURETIC PEPTIDE   D DIMER, QUANTITATIVE   LIPASE   POCT URINE PREGNANCY     EKG Readings: (Independently Interpreted)   Rhythm: Normal Sinus Rhythm. Heart Rate: 60. Ectopy: No Ectopy. Conduction: Normal. ST Segments: Normal ST Segments. T Waves: Normal. Clinical Impression: Normal Sinus Rhythm       Imaging Results          X-Ray Chest PA And Lateral (Final result)  Result time 12/13/18 18:57:41    Final result by Lester Quezada MD (12/13/18 18:57:41)                 Impression:      No acute abnormality.      Electronically signed by: Lester Quezada MD  Date:    12/13/2018  Time:    18:57             Narrative:    EXAMINATION:  XR CHEST PA AND LATERAL    CLINICAL HISTORY:  Chest Pain;    TECHNIQUE:  PA and lateral views of the chest were performed.    COMPARISON:  11/30/2017    FINDINGS:  The lungs are clear, with normal appearance of pulmonary vasculature and no pleural effusion or pneumothorax.    The cardiac silhouette is normal in size. The hilar and mediastinal contours are unremarkable.    Bones are intact.                              X-Rays:   Independently Interpreted Readings:   Chest X-Ray: No infiltrates.  No acute abnormalities.     Medical Decision Making:   History:   Old Medical Records: I decided to obtain old medical records.  Old Records Summarized: other records.       <> Summary of Records: Evaluated in this ED approximately 1 year ago for headache  Differential Diagnosis:   GERD  Gastritis  Hepatobiliary disease  Musculoskeletal plain  ACS  PE  Aortic dissection  Independently Interpreted Test(s):   I have ordered and independently interpreted X-rays - see prior notes.  I have ordered and independently interpreted EKG Reading(s) - see prior notes  Clinical Tests:   Lab Tests: Ordered and Reviewed  Radiological Study: Ordered and Reviewed  Medical Tests: Ordered and Reviewed  ED Management:  Patient afebrile in no acute distress.  EKG is without definite acute ischemic changes.  Chest x-ray without evidence of pneumonia.  Troponin, BNP and lipase are within acceptable limits. D-dimer is negative. I have low clinical suspicion of PE or aortic dissection in this patient symptoms improved with Pepcid and GI cocktail.  Patient is hemodynamically stable and fit for discharge to follow up primary physician. counseled on supportive care, appropriate medication usage, concerning symptoms for which to return to ER and the importance of follow up. Understanding and agreement with treatment plan was expressed.   This chart was completed using dictation software, as a result there may be some typographical errors.             Scribe Attestation:   Scribe #1: I performed the above scribed service and the documentation accurately describes the services I performed. I attest to the accuracy of the note.    Attending Attestation:           Physician Attestation for Scribe:  Physician Attestation Statement for Scribe #1: I, Lissette Bartholomew MD, reviewed documentation, as scribed by Alice Villaseñor in my presence, and it is both accurate and complete.                    Clinical Impression:   The encounter diagnosis was Chest pain of uncertain etiology.      Disposition:   Disposition: Discharged  Condition: Stable                        Lissette Clark MD  12/13/18 2014

## 2019-02-11 ENCOUNTER — HOSPITAL ENCOUNTER (EMERGENCY)
Facility: HOSPITAL | Age: 54
Discharge: HOME | End: 2019-02-11
Attending: EMERGENCY MEDICINE
Payer: MEDICAID

## 2019-02-11 VITALS
BODY MASS INDEX: 33.13 KG/M2 | HEART RATE: 72 BPM | SYSTOLIC BLOOD PRESSURE: 124 MMHG | DIASTOLIC BLOOD PRESSURE: 71 MMHG | RESPIRATION RATE: 18 BRPM | TEMPERATURE: 98 F | WEIGHT: 180 LBS | OXYGEN SATURATION: 97 % | HEIGHT: 62 IN

## 2019-02-11 DIAGNOSIS — R68.89 FLU-LIKE SYMPTOMS: ICD-10-CM

## 2019-02-11 DIAGNOSIS — J06.9 VIRAL URI WITH COUGH: Primary | ICD-10-CM

## 2019-02-11 LAB
AMORPH CRY URNS QL MICRO: NORMAL
BACTERIA #/AREA URNS HPF: NORMAL /HPF
BILIRUB UR QL STRIP: NEGATIVE
CLARITY UR: ABNORMAL
COLOR UR: YELLOW
CTP QC/QA: YES
FLUAV AG NPH QL: NEGATIVE
FLUBV AG NPH QL: NEGATIVE
GLUCOSE UR QL STRIP: NEGATIVE
HGB UR QL STRIP: NEGATIVE
KETONES UR QL STRIP: NEGATIVE
LEUKOCYTE ESTERASE UR QL STRIP: ABNORMAL
MICROSCOPIC COMMENT: NORMAL
NITRITE UR QL STRIP: NEGATIVE
PH UR STRIP: 5 [PH] (ref 5–8)
PROT UR QL STRIP: NEGATIVE
SP GR UR STRIP: 1.03 (ref 1–1.03)
SQUAMOUS #/AREA URNS HPF: 10 /HPF
URN SPEC COLLECT METH UR: ABNORMAL
UROBILINOGEN UR STRIP-ACNC: NEGATIVE EU/DL
WBC #/AREA URNS HPF: 3 /HPF (ref 0–5)

## 2019-02-11 PROCEDURE — 81000 URINALYSIS NONAUTO W/SCOPE: CPT

## 2019-02-11 PROCEDURE — 99283 EMERGENCY DEPT VISIT LOW MDM: CPT

## 2019-02-11 PROCEDURE — 25000003 PHARM REV CODE 250: Performed by: PHYSICIAN ASSISTANT

## 2019-02-11 RX ORDER — OSELTAMIVIR PHOSPHATE 75 MG/1
75 CAPSULE ORAL 2 TIMES DAILY
Qty: 10 CAPSULE | Refills: 0 | Status: SHIPPED | OUTPATIENT
Start: 2019-02-11 | End: 2019-02-16

## 2019-02-11 RX ORDER — ACETAMINOPHEN 500 MG
500 TABLET ORAL
Status: COMPLETED | OUTPATIENT
Start: 2019-02-11 | End: 2019-02-11

## 2019-02-11 RX ORDER — BENZONATATE 100 MG/1
100 CAPSULE ORAL 3 TIMES DAILY PRN
Qty: 20 CAPSULE | Refills: 0 | Status: SHIPPED | OUTPATIENT
Start: 2019-02-11 | End: 2019-02-18

## 2019-02-11 RX ORDER — FLUTICASONE PROPIONATE 50 MCG
1 SPRAY, SUSPENSION (ML) NASAL 2 TIMES DAILY PRN
Qty: 15 G | Refills: 0 | Status: SHIPPED | OUTPATIENT
Start: 2019-02-11 | End: 2019-03-13

## 2019-02-11 RX ORDER — BENZONATATE 100 MG/1
100 CAPSULE ORAL
Status: COMPLETED | OUTPATIENT
Start: 2019-02-11 | End: 2019-02-11

## 2019-02-11 RX ORDER — IBUPROFEN 600 MG/1
600 TABLET ORAL
Status: COMPLETED | OUTPATIENT
Start: 2019-02-11 | End: 2019-02-11

## 2019-02-11 RX ORDER — IBUPROFEN 600 MG/1
600 TABLET ORAL EVERY 6 HOURS PRN
Qty: 20 TABLET | Refills: 0 | Status: SHIPPED | OUTPATIENT
Start: 2019-02-11 | End: 2019-02-16

## 2019-02-11 RX ORDER — IBUPROFEN 600 MG/1
600 TABLET ORAL EVERY 6 HOURS PRN
Qty: 20 TABLET | Refills: 0 | Status: SHIPPED | OUTPATIENT
Start: 2019-02-11 | End: 2019-02-11 | Stop reason: SDUPTHER

## 2019-02-11 RX ADMIN — IBUPROFEN 600 MG: 600 TABLET ORAL at 12:02

## 2019-02-11 RX ADMIN — ACETAMINOPHEN 500 MG: 500 TABLET, FILM COATED ORAL at 12:02

## 2019-02-11 RX ADMIN — BENZONATATE 100 MG: 100 CAPSULE ORAL at 12:02

## 2019-02-11 NOTE — ED PROVIDER NOTES
"Encounter Date: 2/11/2019    SCRIBE #1 NOTE: I, Sha Viera, am scribing for, and in the presence of,  Stephanie Fisher PA-C. I have scribed the following portions of the note - Other sections scribed: HPI and ROS.       History     Chief Complaint   Patient presents with    Generalized Body Aches     " body achy coughing head just stuffy" symptoms since 3 days      CC: Generalized Body Aches    HPI: This is a 53 y.o. female PMHx HTN, HLD, arthritis who presents with subjective fever, generalized body aches, dry cough, chills, clear rhinorrhea, right otalgia, sinus pain and pressure and frontal headache 7/10 that began 3 days ago. No n/v, dysuria, frequency, vision changes. Denies smoking. No personal history of asthma. Patient took a Claritin and 2 Tylenol last night; however, these did not provide relief. No further symptoms or alleviating/exacerbating factors.      The history is provided by the patient. No  was used.     Review of patient's allergies indicates:  No Known Allergies  Past Medical History:   Diagnosis Date    Arthritis     History of heavy vaginal bleeding     Hyperlipidemia     Hypertension      Past Surgical History:   Procedure Laterality Date    DILATION AND CURETTAGE OF UTERUS       Family History   Problem Relation Age of Onset    Hypertension Mother     Diabetes Mother     Cancer Father     Cancer Brother      Social History     Tobacco Use    Smoking status: Never Smoker    Smokeless tobacco: Never Used   Substance Use Topics    Alcohol use: Yes     Comment: socially    Drug use: No     Review of Systems   Constitutional: Positive for chills and fever.   HENT: Positive for congestion, ear pain (R), rhinorrhea, sinus pressure and sinus pain. Negative for sore throat.    Eyes: Negative for redness and visual disturbance.   Respiratory: Positive for cough.    Gastrointestinal: Negative for abdominal pain, diarrhea and vomiting.   Genitourinary: Negative " for dysuria, frequency, hematuria and urgency.   Musculoskeletal: Positive for myalgias.   Skin: Negative for rash.   Neurological: Positive for headaches. Negative for weakness and numbness.   Psychiatric/Behavioral: Negative for confusion.       Physical Exam     Initial Vitals [02/11/19 0940]   BP Pulse Resp Temp SpO2   125/73 86 20 98.4 °F (36.9 °C) 99 %      MAP       --         Physical Exam    Nursing note and vitals reviewed.  Constitutional: She appears well-developed and well-nourished.   HENT:   Head: Normocephalic.   Right Ear: Hearing, tympanic membrane, external ear and ear canal normal.   Left Ear: Hearing, external ear and ear canal normal. Tympanic membrane is not erythematous and not bulging. A middle ear effusion is present.   Nose: Mucosal edema and rhinorrhea present. Right sinus exhibits no maxillary sinus tenderness and no frontal sinus tenderness. Left sinus exhibits no maxillary sinus tenderness and no frontal sinus tenderness.   Mouth/Throat: Uvula is midline, oropharynx is clear and moist and mucous membranes are normal. No oropharyngeal exudate, posterior oropharyngeal edema or posterior oropharyngeal erythema.   Postnasal drip    Eyes: Conjunctivae are normal.   Cardiovascular: Normal rate and regular rhythm. Exam reveals no gallop and no friction rub.    No murmur heard.  Pulmonary/Chest: Breath sounds normal. She has no wheezes. She has no rhonchi. She has no rales.   Abdominal: Soft. Bowel sounds are normal. She exhibits no distension. There is no tenderness. There is no rebound, no guarding, no tenderness at McBurney's point and negative Prado's sign.   Musculoskeletal: Normal range of motion.   Lymphadenopathy:     She has no cervical adenopathy.   Neurological: She is alert. She has normal strength. No cranial nerve deficit or sensory deficit.   Skin: Skin is warm and dry.   Psychiatric: She has a normal mood and affect.         ED Course   Procedures  Labs Reviewed   URINALYSIS,  REFLEX TO URINE CULTURE - Abnormal; Notable for the following components:       Result Value    Appearance, UA Hazy (*)     Leukocytes, UA 2+ (*)     All other components within normal limits    Narrative:     Preferred Collection Type->Urine, Clean Catch   URINALYSIS MICROSCOPIC    Narrative:     Preferred Collection Type->Urine, Clean Catch   POCT INFLUENZA A/B          Imaging Results    None          Medical Decision Making:   Initial Assessment:   53-year-old female with history of hypertension presenting for evaluation of .  History of subjective fever, nasal congestion and sinus pain and pressure.  Reports clear rhinorrhea and dry cough.  Exam above.  Lungs clear.  No sinus tenderness. No evidence of bacterial etiology of patient's symptoms at this time.  Tessalon for cough.  Ibuprofen and Tylenol given for pain. Influenza swab negative.  UA is negative for infection.  Tamiflu given for possible influenza.  Coricidin, ibuprofen Tessalon and Flonase for symptomatic treatment.  Primary care follow-up in 2 days.  Return to ER for worsening symptoms or as needed.            Scribe Attestation:   Scribe #1: I performed the above scribed service and the documentation accurately describes the services I performed. I attest to the accuracy of the note.    Attending Attestation:           Physician Attestation for Scribe:  Physician Attestation Statement for Scribe #1: I, Stephanie Fisher PA-C, reviewed documentation, as scribed by Sha Viera in my presence, and it is both accurate and complete.                    Clinical Impression:   The primary encounter diagnosis was Viral URI with cough. A diagnosis of Flu-like symptoms was also pertinent to this visit.                             Stephanie Fisher PA-C  02/11/19 [id number]

## 2019-02-11 NOTE — DISCHARGE INSTRUCTIONS
Take Coricidin for  nasal congestion and pain. Take Ibuprofen for pain. Tessalon for cough. Flonase for nasal congestion. Tamiflu to prevent complications of possible influenza.   Follow up with primary care in 2 days. Return to ER for worsening symptoms or as needed.

## 2019-02-11 NOTE — MEDICAL/APP STUDENT
"  History     Chief Complaint   Patient presents with    Generalized Body Aches     " body achy coughing head just stuffy" symptoms since 3 days      HPI  Ms. Palencia is a 53 year old female with Hypertension, Osteoarthritis, Hyperlipidemia who presents with generalized body aches associated with subjective fever, chills, sore throat, nasal congestion, rhinorrhea, and nonproductive cough. Her symptoms began on 1/8/19 and have gradually worsened. She also complains of intermittent right ear ache. She has attempted Claritin and  She reports taking care of her daughter on Friday for a viral gastritis and believes she may have gotten sick from her. She denies headaches, N/V/D, abdominal pain, bowel or bladder symptoms.  Past Medical History:   Diagnosis Date    Arthritis     History of heavy vaginal bleeding     Hyperlipidemia     Hypertension        Past Surgical History:   Procedure Laterality Date    DILATION AND CURETTAGE OF UTERUS         Family History   Problem Relation Age of Onset    Hypertension Mother     Diabetes Mother     Cancer Father     Cancer Brother        Social History     Tobacco Use    Smoking status: Never Smoker    Smokeless tobacco: Never Used   Substance Use Topics    Alcohol use: Yes     Comment: socially    Drug use: No       Review of Systems   Constitutional: Positive for chills and fever (subjective). Negative for appetite change and diaphoresis.   HENT: Positive for congestion, ear pain (right ear ache), rhinorrhea and sore throat. Negative for postnasal drip, sinus pain and trouble swallowing.    Eyes: Negative for visual disturbance.   Respiratory: Positive for cough. Negative for shortness of breath.    Cardiovascular: Negative for chest pain.   Gastrointestinal: Negative for abdominal pain, diarrhea, nausea and vomiting.   Genitourinary: Negative for dysuria, frequency, hematuria, pelvic pain, vaginal bleeding and vaginal discharge.   Musculoskeletal: Positive for " "myalgias (generalized body aches). Negative for arthralgias and neck pain.   Skin: Negative for rash and wound.   Neurological: Negative for dizziness, light-headedness and headaches.   All other systems reviewed and are negative.      Physical Exam   /73 (BP Location: Left arm, Patient Position: Sitting)   Pulse 86   Temp 98.4 °F (36.9 °C) (Oral)   Resp 20   Ht 5' 2" (1.575 m)   Wt 81.6 kg (180 lb)   SpO2 99%   BMI 32.92 kg/m²     Physical Exam    Constitutional: She appears well-developed and well-nourished. She is not diaphoretic.   Non toxic appearing. No acute distress.   HENT:   Head: Normocephalic and atraumatic.   Posterior pharynx is mildly erythematous. No tonsillar swelling or exudates. Uvula is midline. Oropharynx is moist. Right TM intact, slightly dull compared to the left. No evidence of serous fluid or blood. No erythema or edema. Rhinorrhea is present. Nasal turbinates appear normal.   Eyes: EOM are normal. Pupils are equal, round, and reactive to light.   Neck: Normal range of motion. Neck supple.   Cardiovascular: Normal rate and regular rhythm.   Pulmonary/Chest: Breath sounds normal. No respiratory distress. She has no wheezes.   Abdominal: Soft. Bowel sounds are normal. There is no tenderness.   Musculoskeletal: Normal range of motion. She exhibits no edema or tenderness.   Lymphadenopathy:     She has no cervical adenopathy.   Neurological: She is alert and oriented to person, place, and time. She has normal strength. No cranial nerve deficit. GCS score is 15. GCS eye subscore is 4. GCS verbal subscore is 5. GCS motor subscore is 6.   Skin: Skin is warm and dry. Capillary refill takes less than 2 seconds. No rash noted.   Psychiatric: She has a normal mood and affect. Thought content normal.         ED Course         "

## 2019-02-11 NOTE — ED TRIAGE NOTES
"Patient reports generalized body aches x 3 day and a dry cough that began this am.  Patient reports a "runny nose" x 3 days. Denies fever NVD. Patient denies abdominal pain.    "

## 2019-03-12 ENCOUNTER — HOSPITAL ENCOUNTER (EMERGENCY)
Facility: HOSPITAL | Age: 54
Discharge: HOME | End: 2019-03-12
Attending: EMERGENCY MEDICINE
Payer: MEDICAID

## 2019-03-12 VITALS
RESPIRATION RATE: 18 BRPM | DIASTOLIC BLOOD PRESSURE: 63 MMHG | TEMPERATURE: 99 F | SYSTOLIC BLOOD PRESSURE: 127 MMHG | HEIGHT: 62 IN | OXYGEN SATURATION: 99 % | WEIGHT: 178 LBS | BODY MASS INDEX: 32.76 KG/M2 | HEART RATE: 64 BPM

## 2019-03-12 DIAGNOSIS — R42 DIZZINESS: ICD-10-CM

## 2019-03-12 LAB
ALBUMIN SERPL BCP-MCNC: 3.6 G/DL
ALP SERPL-CCNC: 70 U/L
ALT SERPL W/O P-5'-P-CCNC: 15 U/L
ANION GAP SERPL CALC-SCNC: 7 MMOL/L
AST SERPL-CCNC: 16 U/L
BASOPHILS # BLD AUTO: 0.02 K/UL
BASOPHILS NFR BLD: 0.7 %
BILIRUB SERPL-MCNC: 0.6 MG/DL
BILIRUB UR QL STRIP: NEGATIVE
BUN SERPL-MCNC: 11 MG/DL
CALCIUM SERPL-MCNC: 9.5 MG/DL
CHLORIDE SERPL-SCNC: 102 MMOL/L
CLARITY UR: CLEAR
CO2 SERPL-SCNC: 29 MMOL/L
COLOR UR: YELLOW
CREAT SERPL-MCNC: 0.7 MG/DL
DIFFERENTIAL METHOD: ABNORMAL
EOSINOPHIL # BLD AUTO: 0.1 K/UL
EOSINOPHIL NFR BLD: 5 %
ERYTHROCYTE [DISTWIDTH] IN BLOOD BY AUTOMATED COUNT: 15.5 %
EST. GFR  (AFRICAN AMERICAN): >60 ML/MIN/1.73 M^2
EST. GFR  (NON AFRICAN AMERICAN): >60 ML/MIN/1.73 M^2
GLUCOSE SERPL-MCNC: 81 MG/DL
GLUCOSE UR QL STRIP: NEGATIVE
HCT VFR BLD AUTO: 36.9 %
HGB BLD-MCNC: 11.4 G/DL
HGB UR QL STRIP: NEGATIVE
KETONES UR QL STRIP: NEGATIVE
LEUKOCYTE ESTERASE UR QL STRIP: NEGATIVE
LYMPHOCYTES # BLD AUTO: 1.4 K/UL
LYMPHOCYTES NFR BLD: 51.2 %
MCH RBC QN AUTO: 26.3 PG
MCHC RBC AUTO-ENTMCNC: 30.9 G/DL
MCV RBC AUTO: 85 FL
MONOCYTES # BLD AUTO: 0.2 K/UL
MONOCYTES NFR BLD: 7.5 %
NEUTROPHILS # BLD AUTO: 1 K/UL
NEUTROPHILS NFR BLD: 35.6 %
NITRITE UR QL STRIP: NEGATIVE
PH UR STRIP: 7 [PH] (ref 5–8)
PLATELET # BLD AUTO: 280 K/UL
PMV BLD AUTO: 10.1 FL
POTASSIUM SERPL-SCNC: 3.8 MMOL/L
PROT SERPL-MCNC: 7.3 G/DL
PROT UR QL STRIP: NEGATIVE
RBC # BLD AUTO: 4.33 M/UL
SODIUM SERPL-SCNC: 138 MMOL/L
SP GR UR STRIP: 1.01 (ref 1–1.03)
URN SPEC COLLECT METH UR: NORMAL
UROBILINOGEN UR STRIP-ACNC: NEGATIVE EU/DL
WBC # BLD AUTO: 2.81 K/UL

## 2019-03-12 PROCEDURE — 99284 EMERGENCY DEPT VISIT MOD MDM: CPT

## 2019-03-12 PROCEDURE — 81003 URINALYSIS AUTO W/O SCOPE: CPT

## 2019-03-12 PROCEDURE — 80053 COMPREHEN METABOLIC PANEL: CPT

## 2019-03-12 PROCEDURE — 85025 COMPLETE CBC W/AUTO DIFF WBC: CPT

## 2019-03-12 RX ORDER — MECLIZINE HYDROCHLORIDE 25 MG/1
25 TABLET ORAL EVERY 6 HOURS PRN
Qty: 20 TABLET | Refills: 0 | Status: SHIPPED | OUTPATIENT
Start: 2019-03-12 | End: 2019-03-19

## 2019-03-12 NOTE — DISCHARGE INSTRUCTIONS
Please return immediately if you get worse or if new problems develop.  Please follow-up with your primary care doctor, or the neurologist above, this week.  Rest.  Meclizine.

## 2019-03-12 NOTE — ED PROVIDER NOTES
"Encounter Date: 3/12/2019    SCRIBE #1 NOTE: I, Easton Pickering, am scribing for, and in the presence of,  Jg Saleh MD. I have scribed the following portions of the note - Other sections scribed: HPI and ROS.       History     Chief Complaint   Patient presents with    Dizziness     Dizziness that began yesterday with nausea.  "I'm just feeling woozy."     CC: Dizziness    HPI: This 53 y.o F with arthritis, HLD and HTN presents to the ED c/o acute onset of intermittent dizziness described as "off balance and woozy like I'm on a boat" which began when she got out of bed at 0300h yesterday AM. She states her symptoms continued while lying down, but resolved throughout the day. She reports her dizziness returned this AM as she was walking to the door to leave her home. Her symptoms are worse when walking, stating "I feel like I'm going to fall." She does c/o mild (3/10) dizziness currently and intermittent right "achey" otalgia since yesterday. No previous episodes of similar symptoms. She was last seen in this ED 2/11/19 for flu-like symptoms and dx'ed with a viral URI. She denies fever, chills, diaphoresis, nausea, emesis, diarrhea, constipation, chest pain, visual disturbances and tinnitus. No prior tx.       The history is provided by the patient. No  was used.     Review of patient's allergies indicates:  No Known Allergies  Past Medical History:   Diagnosis Date    Arthritis     History of heavy vaginal bleeding     Hyperlipidemia     Hypertension      Past Surgical History:   Procedure Laterality Date    DILATION AND CURETTAGE OF UTERUS       Family History   Problem Relation Age of Onset    Hypertension Mother     Diabetes Mother     Cancer Father     Cancer Brother      Social History     Tobacco Use    Smoking status: Never Smoker    Smokeless tobacco: Never Used   Substance Use Topics    Alcohol use: Yes     Comment: socially    Drug use: No     Review of Systems "   Constitutional: Negative for chills and fever.   HENT: Positive for ear pain (R). Negative for congestion, rhinorrhea, sore throat and tinnitus.    Eyes: Negative for pain and visual disturbance.   Respiratory: Negative for cough and shortness of breath.    Cardiovascular: Negative for chest pain.   Gastrointestinal: Positive for nausea. Negative for abdominal pain, constipation, diarrhea and vomiting.   Genitourinary: Negative for dysuria.   Musculoskeletal: Negative for back pain and neck pain.   Skin: Negative for rash.   Neurological: Positive for dizziness. Negative for headaches.       Physical Exam     Initial Vitals [03/12/19 0833]   BP Pulse Resp Temp SpO2   (!) 141/78 68 16 98.4 °F (36.9 °C) 100 %      MAP       --         Physical Exam  The patient was examined specifically for the following:   General:No significant distress, Good color, Warm and dry. Head and neck:Scalp atraumatic, Neck supple. Neurological:Appropriate conversation, Gross motor deficits. Eyes:Conjugate gaze, Clear corneas. ENT: No epistaxis. Cardiac: Regular rate and rhythm, Grossly normal heart tones. Pulmonary: Wheezing, Rales. Gastrointestinal: Abdominal tenderness, Abdominal distention. Musculoskeletal: Extremity deformity, Apparent pain with range of motion of the joints. Skin: Rash.   The findings on examination were normal except for the following:  The patient's dizziness can be exacerbated with head movement.  The patient has a normal gait.  Lungs are clear the heart tones are normal heel-to-shin and finger-to-nose are normal. Mental status examination, cranial nerves, motor and sensory examination are otherwise unremarkable.   ED Course   Procedures  Labs Reviewed   COMPREHENSIVE METABOLIC PANEL - Abnormal; Notable for the following components:       Result Value    Anion Gap 7 (*)     All other components within normal limits   CBC W/ AUTO DIFFERENTIAL - Abnormal; Notable for the following components:    WBC 2.81 (*)      Hemoglobin 11.4 (*)     Hematocrit 36.9 (*)     MCH 26.3 (*)     MCHC 30.9 (*)     RDW 15.5 (*)     Gran # (ANC) 1.0 (*)     Mono # 0.2 (*)     Gran% 35.6 (*)     Lymph% 51.2 (*)     All other components within normal limits   URINALYSIS, REFLEX TO URINE CULTURE    Narrative:     Preferred Collection Type->Urine, Clean Catch          Imaging Results          CT Head Without Contrast (Final result)  Result time 03/12/19 10:43:54    Final result by Lester Quezada MD (03/12/19 10:43:54)                 Impression:      No acute abnormality.      Electronically signed by: Lester Quezada MD  Date:    03/12/2019  Time:    10:43             Narrative:    EXAMINATION:  CT HEAD WITHOUT CONTRAST    CLINICAL HISTORY:  Dizziness; Dizziness and giddiness    TECHNIQUE:  Low dose axial CT images obtained throughout the head without intravenous contrast. Sagittal and coronal reconstructions were performed.    COMPARISON:  None.    FINDINGS:  Intracranial compartment:    Ventricles and sulci are normal in size for age without evidence of hydrocephalus. No extra-axial blood or fluid collections.    The brain parenchyma appears normal. No parenchymal mass, hemorrhage, edema or major vascular distribution infarct.    Skull/extracranial contents (limited evaluation): No fracture.  Minimal posterior ethmoid paranasal sinus disease.  Otherwise, mastoid air cells and paranasal sinuses are essentially clear.                              Medical decision making:  Given the above, this patient presents to the emergency room with dizziness.  It is a vague sensation of like being on a boat.  At times the patient has trouble with her balance.  Her symptoms are definitely made worse by head movement.  She has had them for days.  She noticed them 1st on waking at 3:00 a.m. in the morning to go to the bathroom.  She has episodes where she has no dizziness but then returns.  I will treat her with meclizine.  I believe this is a peripheral phenomenon  CT of the head is negative for cerebellar hemorrhage in infarct.  I will discharge this patient outpatient evaluation and treatment.                Scribe Attestation:   Scribe #1: I performed the above scribed service and the documentation accurately describes the services I performed. I attest to the accuracy of the note.    Attending Attestation:           Physician Attestation for Scribe:  Physician Attestation Statement for Scribe #1: I, Jg Saleh MD, reviewed documentation, as scribed by Easton Pickering in my presence, and it is both accurate and complete.                    Clinical Impression:       ICD-10-CM ICD-9-CM   1. Dizziness R42 780.4                                Jg Saleh MD  03/12/19 1124

## 2019-03-12 NOTE — ED TRIAGE NOTES
Pt presents to ED c/o dizziness that began yesterday around 0300 AM; she said that she felt off balance as if she was on a karin boat; she also states that she was able to get up this morning and get dressed and she said that afterwards she started to feel off balance again; pt also states that she has a slight frontal dull headache that is a 4/10; + nausea, - V/D/fever/chills/cough; also c/o L thigh pain that she states feels like a muscle pain; pt is AAOx4; denies any other symptoms at this time; pt is AAOx4; will continue to monitor

## 2019-03-19 ENCOUNTER — HOSPITAL ENCOUNTER (EMERGENCY)
Facility: HOSPITAL | Age: 54
Discharge: HOME | End: 2019-03-19
Attending: EMERGENCY MEDICINE
Payer: MEDICAID

## 2019-03-19 VITALS
DIASTOLIC BLOOD PRESSURE: 80 MMHG | SYSTOLIC BLOOD PRESSURE: 129 MMHG | OXYGEN SATURATION: 96 % | BODY MASS INDEX: 32.2 KG/M2 | HEIGHT: 62 IN | WEIGHT: 175 LBS | RESPIRATION RATE: 18 BRPM | HEART RATE: 82 BPM | TEMPERATURE: 98 F

## 2019-03-19 DIAGNOSIS — L25.2 CONTACT DERMATITIS DUE TO DYE, UNSPECIFIED CONTACT DERMATITIS TYPE: Primary | ICD-10-CM

## 2019-03-19 PROCEDURE — 63600175 PHARM REV CODE 636 W HCPCS: Performed by: EMERGENCY MEDICINE

## 2019-03-19 PROCEDURE — 99284 EMERGENCY DEPT VISIT MOD MDM: CPT

## 2019-03-19 RX ORDER — PREDNISONE 20 MG/1
60 TABLET ORAL
Status: COMPLETED | OUTPATIENT
Start: 2019-03-19 | End: 2019-03-19

## 2019-03-19 RX ORDER — HYDROXYZINE HYDROCHLORIDE 25 MG/1
25 TABLET, FILM COATED ORAL
Qty: 30 TABLET | Refills: 0 | Status: SHIPPED | OUTPATIENT
Start: 2019-03-19

## 2019-03-19 RX ORDER — BETAMETHASONE DIPROPIONATE 0.5 MG/ML
LOTION, AUGMENTED TOPICAL DAILY
Qty: 60 ML | Refills: 0 | Status: SHIPPED | OUTPATIENT
Start: 2019-03-19 | End: 2019-03-26

## 2019-03-19 RX ORDER — DIPHENHYDRAMINE HCL 25 MG
25 CAPSULE ORAL EVERY 6 HOURS PRN
COMMUNITY
End: 2019-03-19 | Stop reason: ALTCHOICE

## 2019-03-19 RX ORDER — METHYLPREDNISOLONE 4 MG/1
TABLET ORAL
Qty: 1 PACKAGE | Refills: 0 | Status: SHIPPED | OUTPATIENT
Start: 2019-03-19 | End: 2019-04-09

## 2019-03-19 RX ADMIN — PREDNISONE 60 MG: 20 TABLET ORAL at 04:03

## 2019-03-19 NOTE — ED TRIAGE NOTES
The pt states she had her hair done of last Saturday. Reports she thinks she is having a reaction to the hair dye. Says she wash her hair 4 times without relief.

## 2019-03-19 NOTE — ED PROVIDER NOTES
Encounter Date: 3/19/2019  Patient presents with swelling to back of her head and around her hairline.  States she dyed her hair on Saturday evening nd thinks she is allergic to the red dye.  Patient states she has had this once before.  Patient took Benadryl prior to arrival.  Patient states it feels like it is swelling more.  Patient denies any fever, chills, nausea, vomiting, shortness of breath  SCRIBE #1 NOTE: I, Tejal Gordon, am scribing for, and in the presence of,  Varinder Corona MD. I have scribed the following portions of the note - Other sections scribed: HPI and ROS.       History     Chief Complaint   Patient presents with    Allergic Reaction     Pt had color placed in her hair on Saturday. Pt has had swelling and tenderness since Sunday to her neck and ears.      CC: Allergic Reaction    HPI: This 53 y.o. Female, with a medical history of arthritis, hyperlipidemia, and hypertension, presents to the ED c/o a possible allergic reaction. Pt reports that she had her hair dyed at a salon on Saturday. She states that she thinks she may be allergic to the dye as she has since been experiencing swelling and pain to the back of the head as well as to the face (with facial changes). She adds that her scalp is itching. The symptoms are acute, constant and moderate (5/10). Pt notes taking Benadryl at 12:30 pm today for treatment. She states that she has washed her hair 4x times since onset, but she notes that the symptoms are persisting. No other associated symptoms.       The history is provided by the patient. No  was used.     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         Past Medical History:   Diagnosis Date    Arthritis     History of heavy vaginal bleeding     Hyperlipidemia     Hypertension      Past Surgical History:   Procedure Laterality Date     DILATION AND CURETTAGE OF UTERUS       Family History   Problem Relation Age of Onset    Hypertension Mother     Diabetes Mother     Cancer Father     Cancer Brother      Social History     Tobacco Use    Smoking status: Never Smoker    Smokeless tobacco: Never Used   Substance Use Topics    Alcohol use: Yes     Comment: socially    Drug use: No     Review of Systems   Constitutional: Negative for fever.   HENT: Negative for sore throat.         (+) swelling and pain to the back of the head as well as to the face   Respiratory: Negative for shortness of breath.    Cardiovascular: Negative for chest pain.   Gastrointestinal: Negative for nausea.   Genitourinary: Negative for dysuria.   Musculoskeletal: Negative for back pain.   Skin: Positive for rash.        (+) itching of the scalp   Neurological: Negative for weakness.       Physical Exam     Initial Vitals [03/19/19 1619]   BP Pulse Resp Temp SpO2   126/72 87 18 98.5 °F (36.9 °C) 100 %      MAP       --         Physical Exam    Nursing note and vitals reviewed.  Constitutional: She appears well-developed and well-nourished.   HENT:   Patient has edema and rough textured rash to the scalp and superior posterior neck consistent with hair dye allergy and contact dermatitis.  No evidence of bacterial infection at this time.   Eyes: EOM are normal. Pupils are equal, round, and reactive to light.   Neck: Normal range of motion. Neck supple. No thyromegaly present. No JVD present.   Cardiovascular: Normal rate, regular rhythm, normal heart sounds and intact distal pulses. Exam reveals no gallop and no friction rub.    No murmur heard.  Pulmonary/Chest: Breath sounds normal. No respiratory distress.   Abdominal: Soft. Bowel sounds are normal.   Musculoskeletal: Normal range of motion. She exhibits no edema or tenderness.   Neurological: She is alert and oriented to person, place, and time. She has normal strength.   Skin: Skin is warm and dry.         ED Course    Procedures  Labs Reviewed - No data to display       Imaging Results    None                               Clinical Impression:       ICD-10-CM ICD-9-CM   1. Contact dermatitis due to dye, unspecified contact dermatitis type L25.2 692.89                                Varinder Corona MD  03/19/19 9460

## 2019-09-05 ENCOUNTER — HOSPITAL ENCOUNTER (EMERGENCY)
Facility: HOSPITAL | Age: 54
Discharge: HOME | End: 2019-09-05
Attending: EMERGENCY MEDICINE
Payer: MEDICAID

## 2019-09-05 VITALS
BODY MASS INDEX: 33.13 KG/M2 | HEIGHT: 62 IN | SYSTOLIC BLOOD PRESSURE: 139 MMHG | TEMPERATURE: 98 F | WEIGHT: 180 LBS | OXYGEN SATURATION: 100 % | DIASTOLIC BLOOD PRESSURE: 93 MMHG | HEART RATE: 73 BPM | RESPIRATION RATE: 18 BRPM

## 2019-09-05 DIAGNOSIS — K08.89 PAIN, DENTAL: Primary | ICD-10-CM

## 2019-09-05 LAB
B-HCG UR QL: NEGATIVE
CTP QC/QA: YES

## 2019-09-05 PROCEDURE — 63600175 PHARM REV CODE 636 W HCPCS: Performed by: PHYSICIAN ASSISTANT

## 2019-09-05 PROCEDURE — 81025 URINE PREGNANCY TEST: CPT | Performed by: PHYSICIAN ASSISTANT

## 2019-09-05 PROCEDURE — 99284 EMERGENCY DEPT VISIT MOD MDM: CPT | Mod: 25

## 2019-09-05 PROCEDURE — 96372 THER/PROPH/DIAG INJ SC/IM: CPT

## 2019-09-05 RX ORDER — PENICILLIN V POTASSIUM 500 MG/1
500 TABLET, FILM COATED ORAL 4 TIMES DAILY
Qty: 40 TABLET | Refills: 0 | Status: SHIPPED | OUTPATIENT
Start: 2019-09-05 | End: 2019-09-15

## 2019-09-05 RX ORDER — KETOROLAC TROMETHAMINE 30 MG/ML
15 INJECTION, SOLUTION INTRAMUSCULAR; INTRAVENOUS
Status: COMPLETED | OUTPATIENT
Start: 2019-09-05 | End: 2019-09-05

## 2019-09-05 RX ORDER — HYDROCODONE BITARTRATE AND ACETAMINOPHEN 5; 325 MG/1; MG/1
1 TABLET ORAL EVERY 6 HOURS PRN
COMMUNITY

## 2019-09-05 RX ADMIN — KETOROLAC TROMETHAMINE 15 MG: 30 INJECTION, SOLUTION INTRAMUSCULAR; INTRAVENOUS at 03:09

## 2019-09-05 NOTE — ED TRIAGE NOTES
Patient reports sore throat and pain to right ear since Saturday. Reports having a wisdom tooth pulled on that day and the pain has started not long after. Reports taking Ibuprofen and Norco 5-325mg, last taken at approx 0400.

## 2019-09-05 NOTE — ED PROVIDER NOTES
"Encounter Date: 9/5/2019    SCRIBE #1 NOTE: I, Sultana Gutiérrez, am scribing for, and in the presence of,  LUZ Taylor. I have scribed the following portions of the note - Other sections scribed: HPI and ROS.       History     Chief Complaint   Patient presents with    Dental Pain     "I had a tooth pulled last week and now I'm having bad pains to my throat and my ear." Pt c/o R side pain.     Chief Complaint: Dental Pain  History of Present Illness: History obtained from patient. This 53 y.o. female with a PMHx of Hypertension and Hyperlipidemia presents to the ED complaining of right, bottom dental pain that began 5 days ago. She reports that she had her right bottom wisdom tooth removed on 8/31/2019 by her dentist (Dr. Elysia Bowman), but she is out of town. She states that she is experiencing right ear pain and sore throat as associated symptoms. She denies problems swallowing. She states that she was not given any antibiotics. She reports taking Ibuprofen and Hydrocodone. She adds that her last dose of Hydrocodone was this morning at 0400. She states that she takes 10 mg Amlodipine and 10 mg Pravasttin. She denies any allergies to medications. She reports a past surgical history of dilation and curettage of the uterus. She states occasional alcohol use and denies tobacco or drug use. She denies SOB, cough, fever, and chills.    The history is provided by the patient. No  was used.     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         Past Medical History:   Diagnosis Date    Arthritis     History of heavy vaginal bleeding     Hyperlipidemia     Hypertension      Past Surgical History:   Procedure Laterality Date    DILATION AND CURETTAGE OF UTERUS      MOUTH SURGERY       Family History   Problem Relation Age of Onset    Hypertension Mother     Diabetes Mother     " Cancer Father     Cancer Brother      Social History     Tobacco Use    Smoking status: Never Smoker    Smokeless tobacco: Never Used   Substance Use Topics    Alcohol use: Yes     Comment: socially    Drug use: No     Review of Systems   Constitutional: Negative for chills and fever.   HENT: Positive for dental problem, ear pain (right) and sore throat.    Respiratory: Negative for cough and shortness of breath.    Cardiovascular: Negative for chest pain.   Gastrointestinal: Negative for nausea.   Genitourinary: Negative for dysuria.   Musculoskeletal: Negative for back pain.   Skin: Negative for rash.   Neurological: Negative for weakness.   Hematological: Does not bruise/bleed easily.       Physical Exam     Initial Vitals [09/05/19 1315]   BP Pulse Resp Temp SpO2   139/76 67 18 98.1 °F (36.7 °C) 98 %      MAP       --         Physical Exam    Nursing note and vitals reviewed.  Constitutional: She appears well-developed and well-nourished. No distress.   HENT:   Head: Normocephalic and atraumatic.   Nose: Nose normal.   Mouth/Throat: Uvula is midline and oropharynx is clear and moist. Abnormal dentition.       Patient has multiple tooth extractions of the maxillary and mandibular teeth.  No facial edema or erythema.   Eyes: EOM are normal. Pupils are equal, round, and reactive to light.   Neck: Trachea normal, normal range of motion, full passive range of motion without pain and phonation normal. Neck supple. No stridor present. No spinous process tenderness and no muscular tenderness present. No neck rigidity.   Cardiovascular: Normal rate, regular rhythm and normal heart sounds. Exam reveals no gallop and no friction rub.    No murmur heard.  Pulmonary/Chest: Breath sounds normal. No respiratory distress. She has no wheezes. She has no rhonchi. She has no rales.   Abdominal: Soft. Bowel sounds are normal. There is no tenderness. There is no rebound and no guarding.   Musculoskeletal: Normal range of  motion.   Neurological: She is alert and oriented to person, place, and time. She has normal strength. No cranial nerve deficit or sensory deficit.   Skin: Skin is warm and dry. Capillary refill takes less than 2 seconds.   Psychiatric: She has a normal mood and affect.         ED Course   Procedures  Labs Reviewed   POCT URINE PREGNANCY          Imaging Results    None          Medical Decision Making:   ED Management:  This is an evaluation of a 53 y.o. female that presents to the Emergency Department for dental pain. The patient reports no fever, chills, nausea, vomiting, or inability to open mouth. Physical Exam shows a non-toxic, afebrile, and well appearing female with right maxillary dental pain status post wisdom tooth extraction. There is no facial swelling. There is no visible oral drainable abscess at this time. She has no facial cellulitis, oral swelling, airway compromise, sublingual swelling/elevation, or trismus. Neck spaces are soft. There is no gingival erythema on the facial and lingual surfaces surrounding the tooth.    Vital signs are reassuring. RESULTS:  UPT negative    My overall impression is dental pain status post wisdom tooth extraction. I considered, but at this time, do not suspect Tay's angina, deep space infection, peritonsillar infection, pharyngitis, mastoiditis, or a facial cellulitis.    I will discharge patient home with Pen VK to cover for potential infection.  Will encourage to alternate treatment with prescribed Norco and ibuprofen for pain. The diagnosis, treatment plan, instructions for follow-up and reevaluation with a dentist as well as ED return precautions were discussed and understanding was verbalized. All questions or concerns have been addressed.                   Scribe Attestation:   Scribe #1: I performed the above scribed service and the documentation accurately describes the services I performed. I attest to the accuracy of the note.    Attending Attestation:            Physician Attestation for Scribe:  Physician Attestation Statement for Scribe #1: I, LUZ Taylor, reviewed documentation, as scribed by Sultana Gutiérrez in my presence, and it is both accurate and complete.                    Clinical Impression:       ICD-10-CM ICD-9-CM   1. Pain, dental K08.89 525.9            Scribe attestation: I, Pramod Bardales , personally performed the services described in this documentation. All medical record entries made by the scribe were at my direction and in my presence. I have reviewed the chart and agree that the record reflects my personal performance and is accurate and complete.                    Pramod Bardales PA-C  09/05/19 [id number]

## 2024-12-22 ENCOUNTER — HOSPITAL ENCOUNTER (EMERGENCY)
Facility: HOSPITAL | Age: 59
Discharge: HOME | End: 2024-12-22
Attending: EMERGENCY MEDICINE
Payer: COMMERCIAL

## 2024-12-22 VITALS
TEMPERATURE: 98 F | SYSTOLIC BLOOD PRESSURE: 154 MMHG | OXYGEN SATURATION: 100 % | BODY MASS INDEX: 32.76 KG/M2 | HEIGHT: 62 IN | RESPIRATION RATE: 18 BRPM | HEART RATE: 56 BPM | WEIGHT: 178 LBS | DIASTOLIC BLOOD PRESSURE: 82 MMHG

## 2024-12-22 DIAGNOSIS — R07.9 CHEST PAIN: ICD-10-CM

## 2024-12-22 LAB
ALBUMIN SERPL BCP-MCNC: 4.1 G/DL (ref 3.5–5.2)
ALP SERPL-CCNC: 72 U/L (ref 40–150)
ALT SERPL W/O P-5'-P-CCNC: 16 U/L (ref 10–44)
ANION GAP SERPL CALC-SCNC: 11 MMOL/L (ref 8–16)
AST SERPL-CCNC: 24 U/L (ref 10–40)
BASOPHILS # BLD AUTO: 0.02 K/UL (ref 0–0.2)
BASOPHILS NFR BLD: 0.5 % (ref 0–1.9)
BILIRUB SERPL-MCNC: 0.9 MG/DL (ref 0.1–1)
BNP SERPL-MCNC: 28 PG/ML (ref 0–99)
BUN SERPL-MCNC: 12 MG/DL (ref 6–20)
CALCIUM SERPL-MCNC: 9.4 MG/DL (ref 8.7–10.5)
CHLORIDE SERPL-SCNC: 106 MMOL/L (ref 95–110)
CO2 SERPL-SCNC: 26 MMOL/L (ref 23–29)
CREAT SERPL-MCNC: 0.7 MG/DL (ref 0.5–1.4)
DIFFERENTIAL METHOD BLD: ABNORMAL
EOSINOPHIL # BLD AUTO: 0.2 K/UL (ref 0–0.5)
EOSINOPHIL NFR BLD: 4.7 % (ref 0–8)
ERYTHROCYTE [DISTWIDTH] IN BLOOD BY AUTOMATED COUNT: 13.7 % (ref 11.5–14.5)
EST. GFR  (NO RACE VARIABLE): >60 ML/MIN/1.73 M^2
GLUCOSE SERPL-MCNC: 82 MG/DL (ref 70–110)
HCT VFR BLD AUTO: 39.8 % (ref 37–48.5)
HGB BLD-MCNC: 12.7 G/DL (ref 12–16)
IMM GRANULOCYTES # BLD AUTO: 0 K/UL (ref 0–0.04)
IMM GRANULOCYTES NFR BLD AUTO: 0 % (ref 0–0.5)
LYMPHOCYTES # BLD AUTO: 2 K/UL (ref 1–4.8)
LYMPHOCYTES NFR BLD: 49.6 % (ref 18–48)
MCH RBC QN AUTO: 28.6 PG (ref 27–31)
MCHC RBC AUTO-ENTMCNC: 31.9 G/DL (ref 32–36)
MCV RBC AUTO: 90 FL (ref 82–98)
MONOCYTES # BLD AUTO: 0.3 K/UL (ref 0.3–1)
MONOCYTES NFR BLD: 7.2 % (ref 4–15)
NEUTROPHILS # BLD AUTO: 1.5 K/UL (ref 1.8–7.7)
NEUTROPHILS NFR BLD: 38 % (ref 38–73)
NRBC BLD-RTO: 0 /100 WBC
PLATELET # BLD AUTO: 231 K/UL (ref 150–450)
PMV BLD AUTO: 10.1 FL (ref 9.2–12.9)
POTASSIUM SERPL-SCNC: 3.5 MMOL/L (ref 3.5–5.1)
PROT SERPL-MCNC: 7.5 G/DL (ref 6–8.4)
RBC # BLD AUTO: 4.44 M/UL (ref 4–5.4)
SODIUM SERPL-SCNC: 143 MMOL/L (ref 136–145)
TROPONIN I SERPL DL<=0.01 NG/ML-MCNC: <0.006 NG/ML (ref 0–0.03)
WBC # BLD AUTO: 4.01 K/UL (ref 3.9–12.7)

## 2024-12-22 PROCEDURE — 84484 ASSAY OF TROPONIN QUANT: CPT | Performed by: EMERGENCY MEDICINE

## 2024-12-22 PROCEDURE — 63600175 PHARM REV CODE 636 W HCPCS: Performed by: EMERGENCY MEDICINE

## 2024-12-22 PROCEDURE — 85025 COMPLETE CBC W/AUTO DIFF WBC: CPT | Performed by: EMERGENCY MEDICINE

## 2024-12-22 PROCEDURE — 93010 ELECTROCARDIOGRAM REPORT: CPT | Mod: ,,, | Performed by: INTERNAL MEDICINE

## 2024-12-22 PROCEDURE — 96374 THER/PROPH/DIAG INJ IV PUSH: CPT

## 2024-12-22 PROCEDURE — 80053 COMPREHEN METABOLIC PANEL: CPT | Performed by: EMERGENCY MEDICINE

## 2024-12-22 PROCEDURE — 83880 ASSAY OF NATRIURETIC PEPTIDE: CPT | Performed by: EMERGENCY MEDICINE

## 2024-12-22 PROCEDURE — 93005 ELECTROCARDIOGRAM TRACING: CPT

## 2024-12-22 PROCEDURE — 99285 EMERGENCY DEPT VISIT HI MDM: CPT | Mod: 25

## 2024-12-22 RX ORDER — KETOROLAC TROMETHAMINE 30 MG/ML
10 INJECTION, SOLUTION INTRAMUSCULAR; INTRAVENOUS
Status: COMPLETED | OUTPATIENT
Start: 2024-12-22 | End: 2024-12-22

## 2024-12-22 RX ADMIN — KETOROLAC TROMETHAMINE 10 MG: 30 INJECTION, SOLUTION INTRAMUSCULAR; INTRAVENOUS at 02:12

## 2024-12-22 NOTE — ED TRIAGE NOTES
Pt presents to ED with complaint of intermittent midsternal chest discomfort xfew day that radiates to back. Pt denies truama to area. Pt denies sob.

## 2024-12-22 NOTE — ED PROVIDER NOTES
Encounter Date: 12/22/2024       History     Chief Complaint   Patient presents with    Chest Pain     Pt c/o midsternum chest pain and back pain with dizziness x3 day. Pt stated pain feels like a pressure and has been off and on. Pt denied NVD or SOB.     59-year-old female with a history of hyperlipidemia, hypertension presenting with mild midsternal chest pain radiating to her back.  Notes symptoms are intermittent, worse when pressing on her chest.  Denies fevers, chills, cough, shortness of breath, lower extremity edema.  Denies symptoms worse with exertion.  Also notes mild headache and dizziness.      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         Past Medical History:   Diagnosis Date    Arthritis     History of heavy vaginal bleeding     Hyperlipidemia     Hypertension      Past Surgical History:   Procedure Laterality Date    DILATION AND CURETTAGE OF UTERUS      MOUTH SURGERY       Family History   Problem Relation Name Age of Onset    Hypertension Mother      Diabetes Mother      Cancer Father      Cancer Brother       Social History     Tobacco Use    Smoking status: Never    Smokeless tobacco: Never   Substance Use Topics    Alcohol use: Yes     Comment: socially    Drug use: No     Review of Systems   Respiratory:  Positive for chest tightness.    Neurological:  Positive for headaches.       Physical Exam     Initial Vitals [12/22/24 1314]   BP Pulse Resp Temp SpO2   131/69 64 16 98.1 °F (36.7 °C) 100 %      MAP       --         Physical Exam    Nursing note and vitals reviewed.  Constitutional: She appears well-developed and well-nourished. She is not diaphoretic. No distress.   HENT:   Head: Normocephalic and atraumatic.   Nose: Nose normal.   Eyes: EOM are normal. Pupils are equal, round, and reactive to light.   Neck: Neck supple.   Normal range of motion.  Cardiovascular:   Normal rate, regular rhythm, normal heart sounds and intact distal pulses.     Exam reveals no gallop and no friction rub.       No murmur heard.  Pulmonary/Chest: Breath sounds normal. No respiratory distress. She has no wheezes. She has no rhonchi. She has no rales. She exhibits no tenderness.   Abdominal: Abdomen is soft. Bowel sounds are normal. She exhibits no distension. There is no abdominal tenderness. There is no rebound and no guarding.   Musculoskeletal:         General: No edema. Normal range of motion.      Cervical back: Normal range of motion and neck supple.     Neurological: She is alert and oriented to person, place, and time. She has normal strength. No cranial nerve deficit. GCS score is 15. GCS eye subscore is 4. GCS verbal subscore is 5. GCS motor subscore is 6.   Skin: Skin is warm and dry.   Psychiatric: She has a normal mood and affect. Her behavior is normal.         ED Course   Procedures  Labs Reviewed   CBC W/ AUTO DIFFERENTIAL - Abnormal       Result Value    WBC 4.01      RBC 4.44      Hemoglobin 12.7      Hematocrit 39.8      MCV 90      MCH 28.6      MCHC 31.9 (*)     RDW 13.7      Platelets 231      MPV 10.1      Immature Granulocytes 0.0      Gran # (ANC) 1.5 (*)     Immature Grans (Abs) 0.00      Lymph # 2.0      Mono # 0.3      Eos # 0.2      Baso # 0.02      nRBC 0      Gran % 38.0      Lymph % 49.6 (*)     Mono % 7.2      Eosinophil % 4.7      Basophil % 0.5      Differential Method Automated     COMPREHENSIVE METABOLIC PANEL    Sodium 143      Potassium 3.5      Chloride 106      CO2 26      Glucose 82      BUN 12      Creatinine 0.7      Calcium 9.4      Total Protein 7.5      Albumin 4.1      Total Bilirubin 0.9      Alkaline Phosphatase 72      AST 24      ALT 16      eGFR >60      Anion Gap 11     TROPONIN I    Troponin I <0.006     B-TYPE NATRIURETIC PEPTIDE    BNP 28       EKG Readings: (Independently Interpreted)   Initial Reading: No STEMI. Rhythm: Sinus Bradycardia.  Heart Rate: 59.   No ST segment elevation or depression concerning for acute ischemia.          Imaging Results              X-Ray Chest AP Portable (Final result)  Result time 12/22/24 14:50:33      Final result by Shoal Trujillo MD (12/22/24 14:50:33)                   Impression:      1. Interstitial findings are accentuated by habitus, no acute cardiopulmonary process.      Electronically signed by: Shola Trujillo MD  Date:    12/22/2024  Time:    14:50               Narrative:    EXAMINATION:  XR CHEST AP PORTABLE    CLINICAL HISTORY:  Chest Pain;    TECHNIQUE:  Single frontal view of the chest was performed.    COMPARISON:  12/13/2018    FINDINGS:  The cardiomediastinal silhouette is not enlarged.  There is no pleural effusion.  The trachea is midline.  The lungs are symmetrically expanded bilaterally with coarse interstitial attenuation, accentuated by habitus..  No large focal consolidation seen.  There is no pneumothorax.  The osseous structures are remarkable for degenerative change..                                       Medications   ketorolac injection 9.999 mg (9.999 mg Intravenous Given 12/22/24 1417)     Medical Decision Making  Amount and/or Complexity of Data Reviewed  Labs: ordered.  Radiology: ordered.    Risk  Prescription drug management.                          Medical Decision Making:   Initial Assessment:   59 year old patient presenting secondary to chest pain. Patient is at higher risk of ACS due to risk factors and HPI with a heart score of <=3. Patient has received an aspirin. Troponins, Cxr, ekg, and labs ordered which showed no acute findings.    [email or web address]    Also considered but less likely:     PE: normal rate, no sob/recent immovilization/surgery/travel/family history  Pneumonia: chest xray negative. No fever or leukoscytosis. No cough and lungs non consistent with pna  Tamponade: unlikely due to chest xray and ekg  STEMI: No STEMI  on ekg  Dissection: equal pulses bilaterally and no ripping chest pain to the back  Esophageal rupture: no dysphagia or vomiting and chest xray negative for mediastinal air  Arrhythmia: no arrhythmia on ekg  CHF: no fluid overload on Cxr and physical exam  Pneumothorax: bilateral breath sounds and no signs of pneumothorax on chest xray     Workup reassuring.  Troponin negative.  Pain reproducible to palpation.  Overall suspect non cardiac and non dangerous cause.  Discussed with patient.  Believe she is safe for discharge home.  Patient is amenable to plan.               Clinical Impression:  Final diagnoses:  [R07.9] Chest pain          ED Disposition Condition    Discharge Stable          ED Prescriptions    None       Follow-up Information       Follow up With Specialties Details Why Contact Info    Yossi Alejo MD Family Medicine Schedule an appointment as soon as possible for a visit   [address] [facility] [address]  [phone]      [facility] Emergency Dept Emergency Medicine  As needed, If symptoms worsen [address] [facility] [place] [address] [phone]             Delmar Horvath MD  12/22/24 9747

## 2024-12-24 LAB
OHS QRS DURATION: 78 MS
OHS QTC CALCULATION: 415 MS

## 2025-04-25 ENCOUNTER — HOSPITAL ENCOUNTER (EMERGENCY)
Facility: HOSPITAL | Age: 60
Discharge: HOME | End: 2025-04-25
Attending: EMERGENCY MEDICINE
Payer: COMMERCIAL

## 2025-04-25 VITALS
HEART RATE: 70 BPM | SYSTOLIC BLOOD PRESSURE: 179 MMHG | RESPIRATION RATE: 18 BRPM | HEIGHT: 62 IN | DIASTOLIC BLOOD PRESSURE: 86 MMHG | WEIGHT: 178 LBS | OXYGEN SATURATION: 100 % | TEMPERATURE: 98 F | BODY MASS INDEX: 32.76 KG/M2

## 2025-04-25 DIAGNOSIS — L02.212 ABSCESS OF LOWER BACK: Primary | ICD-10-CM

## 2025-04-25 PROCEDURE — 99283 EMERGENCY DEPT VISIT LOW MDM: CPT | Mod: 25

## 2025-04-25 PROCEDURE — 63600175 PHARM REV CODE 636 W HCPCS

## 2025-04-25 PROCEDURE — 25000003 PHARM REV CODE 250

## 2025-04-25 PROCEDURE — 10061 I&D ABSCESS COMP/MULTIPLE: CPT

## 2025-04-25 RX ORDER — SULFAMETHOXAZOLE AND TRIMETHOPRIM 800; 160 MG/1; MG/1
1 TABLET ORAL
Status: COMPLETED | OUTPATIENT
Start: 2025-04-25 | End: 2025-04-25

## 2025-04-25 RX ORDER — SULFAMETHOXAZOLE AND TRIMETHOPRIM 800; 160 MG/1; MG/1
2 TABLET ORAL 2 TIMES DAILY
Qty: 28 TABLET | Refills: 0 | Status: SHIPPED | OUTPATIENT
Start: 2025-04-25 | End: 2025-05-02

## 2025-04-25 RX ORDER — LIDOCAINE HYDROCHLORIDE 10 MG/ML
5 INJECTION, SOLUTION INFILTRATION; PERINEURAL
Status: COMPLETED | OUTPATIENT
Start: 2025-04-25 | End: 2025-04-25

## 2025-04-25 RX ADMIN — SULFAMETHOXAZOLE AND TRIMETHOPRIM 1 TABLET: 800; 160 TABLET ORAL at 08:04

## 2025-04-25 RX ADMIN — LIDOCAINE HYDROCHLORIDE 5 ML: 10 INJECTION, SOLUTION INFILTRATION; PERINEURAL at 08:04

## 2025-04-26 NOTE — ED PROVIDER NOTES
Encounter Date: 4/25/2025    SCRIBE #1 NOTE: I, Radha Marquez, am scribing for, and in the presence of,  Carrie Montemayor PA-C. I have scribed the following portions of the note - Other sections scribed: HPI, ROS.       History     Chief Complaint   Patient presents with    Abscess     Pt arrived in ED, c/o abscess or bump at tailbone. Pt reports been evaluated and treated at Urgent Care on Monday but it appears worst. Pt reports being placed on antibiotics. Pt denies any CP, SOB, abd pain or n/v/d     Carlos Palencia is a 59 y.o. female with PMHx of HTN and HLD who presents to the ED with an abscess to the right lower back/upper buttock area for over a week. Patient reports she was evaluated and treated at an Urgent Care on 4/21/25 and placed on Keflex TID x 7 days. She denies any improvement with the antibiotics, stating the pain has worsened and the abscess has grown in size. She attempted treatment with Tylenol (x2) around 1400 today with no significant relief. No other exacerbating or alleviating factors. She reports some abdominal pain yesterday but none today. Denies any drainage from abscess. Denies fever, nausea, emesis, or other associated symptoms.       The history is provided by the patient. No  was used.     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   HENT:  Negative for sore throat.    Eyes:  Negative  for visual disturbance.   Respiratory:  Negative for shortness of breath.    Cardiovascular:  Negative for chest pain.   Gastrointestinal:  Positive for abdominal pain (Resolved). Negative for nausea and vomiting.   Genitourinary:  Negative for dysuria.   Musculoskeletal:  Negative for back pain.   Skin:  Negative for rash.        (+)Painful abscess to right lower back/buttock area  (-) Drainage    Neurological:  Negative for headaches.       Physical Exam     Initial Vitals [04/25/25 1816]   BP Pulse Resp Temp SpO2   (!) 172/82 67 16 98.3 °F (36.8 °C) 97 %      MAP       --         Physical Exam    Vitals reviewed.  Constitutional: She appears well-developed and well-nourished. No distress.   HENT:   Head: Normocephalic.   Eyes: Conjunctivae are normal. Right eye exhibits no discharge. Left eye exhibits no discharge. No scleral icterus.   Pulmonary/Chest: No respiratory distress.   Musculoskeletal:         General: Normal range of motion.        Back:       Comments: Large fluctuant abscess that is tender to palpation noted to the right lower back.  Erythema noted.  No active drainage.     Neurological: She is alert.   Skin: Skin is warm and dry. No rash noted. No erythema.   Psychiatric: She has a normal mood and affect. Her behavior is normal. Judgment and thought content normal.         ED Course   I & D - Incision and Drainage    Date/Time: 4/25/2025 8:39 PM  Location procedure was performed: [facility] EMERGENCY DEPARTMENT    Performed by: Carrie Montemayor PA-C  Authorized by: Delmar Horvath MD  Comments: Procedure/indications/benefits/risks/alternatives discussed with patient/guardian prior to the procedure.   Risks may include pain, bleeding, infection, reaction to injection components, damage to surrounding structures including nerve and vascular damage, failure to achieve intended goal, need for further treatment as well as other less likely risks.    Verbal consent obtained.      Time-Out completed:  "  - CORRECT PATIENT ID  - CORRECT SITE MARKED                        - PROCEDURE VERIFIED  - POSITIONING VERIFIED  - EQUIPMENT AVAILABLE  - RADIOGRAPHS AVAILABLE (If Needed)    Fluctuant mass suspected to be an abscess was noted to the right lower back. This area was cleaned. 27G needle was used to inject 3mL of Lidocaine 1% without epinephrine. The wound was then opened, revealing a moderate amount of purulent material. The wound was dissected bluntly in a 360 degree direction with sterile instruments. It was then expressed, irrigated and packed with 1/4" sterile packing. The wound was then cleaned again and dressed. Wound care, packing removal and return precautions were discussed with the patient. This was a complex I&D.          Labs Reviewed - No data to display       Imaging Results    None          Medications   LIDOcaine HCL 10 mg/ml (1%) injection 5 mL (5 mLs Infiltration Given by Other 4/25/25 2006)   sulfamethoxazole-trimethoprim 800-160mg per tablet 1 tablet (1 tablet Oral Given 4/25/25 2035)     Medical Decision Making  This is an evaluation of a 59 y.o. female that presents to the Emergency Department for an abscess. Physical Exam shows a non-toxic, afebrile, and well appearing female. There is an abscess to the right lower back with fluctuance and erythema. There is no active drainage. No pain/tenderness outside of erythematous region. No areas of bruising, bullae, or crepitus.    Vital Signs Are Reassuring. Procedure: Abscess was drained per the procedure note.    My overall impression is Abscess of the right lower back. I considered, but at this time, do not suspect an extensive cellulitis, sepsis, necrotizing fasciitis, or bacteremia.     D/C Meds:  Informed patient to stop taking the Keflex will change antibiotics to Bactrim. D/C Information: Wound care, Warm compresses. The diagnosis, treatment plan, instructions for follow-up and reevaluation with her PCP or the ED in 2 - 3 days for wound " recheck as well as ED return precautions were discussed and understanding was verbalized. All questions or concerns have been addressed.       Risk  Prescription drug management.            Scribe Attestation:   Scribe #1: I performed the above scribed service and the documentation accurately describes the services I performed. I attest to the accuracy of the note.                             I, Carrie Montemayor PA-C, personally performed the services described in this documentation. All medical record entries made by the scribe were at my direction and in my presence. I have reviewed the chart and agree that the record reflects my personal performance and is accurate and complete.    Clinical Impression:  Final diagnoses:  [L02.212] Abscess of lower back (Primary)          ED Disposition Condition    Discharge Stable          ED Prescriptions       Medication Sig Dispense Start Date End Date Auth. Provider    sulfamethoxazole-trimethoprim 800-160mg (BACTRIM DS) 800-160 mg Tab Take 2 tablets by mouth 2 (two) times daily. for 7 days 28 tablet 4/25/2025 5/2/2025 Carrie Montemayor PA-C          Follow-up Information       Follow up With Specialties Details Why Contact Info    Yossi Alejo MD Family Medicine Schedule an appointment as soon as possible for a visit in 3 days for follow up [address] [facility] [address]  [phone]      [facility] - Emergency Dept Emergency Medicine Go to  If symptoms worsen, As needed, shortness of breath, chest pain, fever, worsening cough, nausea, vomiting, abdominal pain [address] [facility] [place] [address] [phone]                 [1]   Social History  Tobacco Use    Smoking status: Never    Smokeless tobacco: Never   Substance Use Topics    Alcohol use: Yes     Comment: socially    Drug use: No        Carrie Montemayor PA-C  04/25/25 2041